# Patient Record
Sex: FEMALE | Race: WHITE | NOT HISPANIC OR LATINO | Employment: FULL TIME | URBAN - METROPOLITAN AREA
[De-identification: names, ages, dates, MRNs, and addresses within clinical notes are randomized per-mention and may not be internally consistent; named-entity substitution may affect disease eponyms.]

---

## 2018-04-25 ENCOUNTER — HOSPITAL ENCOUNTER (OUTPATIENT)
Dept: GENERAL RADIOLOGY | Facility: HOSPITAL | Age: 63
Discharge: HOME | End: 2018-04-25
Attending: FAMILY MEDICINE | Admitting: FAMILY MEDICINE

## 2018-12-28 ENCOUNTER — CONVERSION ENCOUNTER (OUTPATIENT)
Dept: FAMILY MEDICINE CLINIC | Facility: CLINIC | Age: 63
End: 2018-12-28

## 2018-12-28 LAB
ALBUMIN SERPL-MCNC: 4 G/DL (ref 3.6–5.1)
ALP SERPL-CCNC: 112 U/L (ref 33–130)
AST SERPL-CCNC: 25 U/L (ref 10–35)
BILIRUB SERPL-MCNC: 0.4 MG/DL (ref 0.2–1.2)
BUN SERPL-MCNC: 11 MG/DL (ref 7–25)
BUN/CREAT SERPL: 12.2 (CALC) (ref 6–22)
CALCIUM SERPL-MCNC: 9.4 MG/DL (ref 8.6–10.2)
CHLORIDE SERPL-SCNC: 106 MMOL/L (ref 98–110)
CHOLEST SERPL-MCNC: 202 MG/DL (ref 125–200)
CONV CO2: 23 MMOL/L (ref 21–33)
CONV TOTAL PROTEIN: 6.4 G/DL (ref 6.2–8.3)
CREAT UR-MCNC: 0.9 MG/DL (ref 0.6–1.18)
GLOBULIN UR ELPH-MCNC: 2.4 MG/DL (ref 2.2–3.9)
GLUCOSE UR QL: 105 MG/DL (ref 65–99)
HDLC SERPL-MCNC: 70 MG/DL
INSULIN SERPL-ACNC: 1.7 (CALC) (ref 1–2.1)
LDLC SERPL CALC-MCNC: 93 MG/DL
POTASSIUM SERPL-SCNC: 4.2 MMOL/L (ref 3.5–5.3)
SODIUM SERPL-SCNC: 140 MMOL/L (ref 135–146)
TRIGL SERPL-MCNC: 196 MG/DL

## 2019-08-12 ENCOUNTER — OFFICE VISIT (OUTPATIENT)
Dept: FAMILY MEDICINE CLINIC | Facility: CLINIC | Age: 64
End: 2019-08-12

## 2019-08-12 VITALS
HEIGHT: 62 IN | RESPIRATION RATE: 18 BRPM | DIASTOLIC BLOOD PRESSURE: 70 MMHG | SYSTOLIC BLOOD PRESSURE: 130 MMHG | HEART RATE: 80 BPM | WEIGHT: 203 LBS | OXYGEN SATURATION: 97 % | BODY MASS INDEX: 37.36 KG/M2 | TEMPERATURE: 97.9 F
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
## 2019-08-12 DIAGNOSIS — L03.114 CELLULITIS OF LEFT UPPER EXTREMITY: Primary | ICD-10-CM

## 2019-08-12 DIAGNOSIS — E78.2 MIXED HYPERLIPIDEMIA: ICD-10-CM

## 2019-08-12 PROBLEM — S83.242A TEAR OF MEDIAL MENISCUS OF LEFT KNEE: Status: ACTIVE | Noted: 2019-05-01

## 2019-08-12 PROCEDURE — 99213 OFFICE O/P EST LOW 20 MIN: CPT | Performed by: FAMILY MEDICINE

## 2019-08-12 RX ORDER — DOXYCYCLINE 100 MG/1
100 CAPSULE ORAL 2 TIMES DAILY
Qty: 20 CAPSULE | Refills: 0 | Status: SHIPPED | OUTPATIENT
Start: 2019-08-12 | End: 2019-08-22

## 2019-08-12 RX ORDER — MELATONIN 10 MG
1 CAPSULE ORAL NIGHTLY
COMMUNITY

## 2019-08-12 RX ORDER — ATORVASTATIN CALCIUM 20 MG/1
20 TABLET, FILM COATED ORAL DAILY
COMMUNITY
End: 2020-02-03 | Stop reason: SDUPTHER

## 2019-08-12 RX ORDER — NAPROXEN SODIUM 220 MG
440 TABLET ORAL 2 TIMES DAILY PRN
COMMUNITY
End: 2020-07-31

## 2019-08-12 RX ORDER — OMEPRAZOLE 20 MG/1
20 CAPSULE, DELAYED RELEASE ORAL DAILY
COMMUNITY

## 2019-08-12 RX ORDER — DIPHENHYDRAMINE HCL 25 MG
25 TABLET ORAL EVERY 6 HOURS PRN
COMMUNITY

## 2019-08-12 RX ORDER — ASPIRIN 81 MG/1
81 TABLET ORAL DAILY
COMMUNITY

## 2019-08-12 NOTE — PROGRESS NOTES
Subjective   Camille Mendoza is a 64 y.o. female.     Insect Bite   This is a new (Camille was bitten by a tick on Wednesday 08/07/2019) problem. The current episode started in the past 7 days. The problem occurs constantly. Pertinent negatives include no abdominal pain, arthralgias, chest pain, chills, coughing, fatigue, fever, joint swelling, nausea, rash or vomiting. Nothing aggravates the symptoms. The treatment provided no relief.   Hyperlipidemia   This is a chronic problem. The current episode started more than 1 year ago. The problem is controlled. Exacerbating diseases include obesity. She has no history of diabetes. Pertinent negatives include no chest pain. Current antihyperlipidemic treatment includes statins. The current treatment provides significant improvement of lipids. Risk factors for coronary artery disease include obesity and dyslipidemia.        The following portions of the patient's history were reviewed and updated as appropriate: allergies, current medications, past family history, past medical history, past social history, past surgical history and problem list.    Family History   Problem Relation Age of Onset   • Arthritis Mother    • Skin cancer Mother    • Diabetes Father    • Heart disease Father    • Hypertension Father        Social History     Tobacco Use   • Smoking status: Never Smoker   • Smokeless tobacco: Never Used   Substance Use Topics   • Alcohol use: Yes     Frequency: 2-3 times a week     Drinks per session: 1 or 2     Binge frequency: Never   • Drug use: No       Past Surgical History:   Procedure Laterality Date   • CARDIAC CATHETERIZATION  02/2007    Dr. Paris   • DILATION AND CURETTAGE, DIAGNOSTIC / THERAPEUTIC  1974   • KNEE ARTHROSCOPY Left 05/22/2019    [facility] with partial medial and lateral meniscectomy   • TONSILLECTOMY AND ADENOIDECTOMY  1959       Patient Active Problem List   Diagnosis   • Gastroesophageal reflux disease without esophagitis   •  "Menopausal syndrome   • Mixed hyperlipidemia   • Osteopenia of multiple sites   • Paroxysmal supraventricular tachycardia (CMS/HCC)   • Primary osteoarthritis of both knees   • Seasonal allergic rhinitis due to pollen   • Anxiety   • Tear of medial meniscus of left knee       No current outpatient medications on file prior to visit.     No current facility-administered medications on file prior to visit.        Allergies   Allergen Reactions   • Meat Extract Hives     TICK Borne Allergy, hives   • Coffee Nausea Only and Palpitations       Review of Systems   Constitutional: Negative for chills, fatigue and fever.   Respiratory: Negative for cough and wheezing.    Cardiovascular: Negative for chest pain, palpitations and leg swelling.   Gastrointestinal: Negative for abdominal pain, nausea and vomiting.   Endocrine: Negative for cold intolerance, heat intolerance, polyphagia and polyuria.   Genitourinary: Negative for dysuria.   Musculoskeletal: Negative for arthralgias and joint swelling.   Skin: Negative for rash.        Tic bite extensor surface of left wrist.    Allergic/Immunologic: Positive for environmental allergies.   Hematological: Negative for adenopathy.       Objective   Visit Vitals  /70 (BP Location: Right arm, Patient Position: Sitting, Cuff Size: Adult)   Pulse 80   Temp 97.9 °F (36.6 °C) (Oral)   Resp 18   Ht 157.5 cm (62\")   Wt 92.1 kg (203 lb)   LMP 01/01/2003 (Approximate)   SpO2 97% Comment: Room air   Breastfeeding? No   BMI 37.13 kg/m²     Physical Exam   Constitutional: She is oriented to person, place, and time. She appears well-developed and well-nourished. No distress.   HENT:   Mouth/Throat: No oropharyngeal exudate.   Eyes: Conjunctivae are normal.   Neck: Neck supple. No thyromegaly present.   Cardiovascular: Normal rate, regular rhythm, normal heart sounds and intact distal pulses. Exam reveals no gallop and no friction rub.   No murmur heard.  Pulmonary/Chest: Effort normal and " breath sounds normal. No respiratory distress. She has no wheezes. She has no rales.   Musculoskeletal: She exhibits no edema.   Lymphadenopathy:        Head (right side): No submandibular adenopathy present.        Head (left side): No submandibular adenopathy present.     She has no cervical adenopathy.     She has no axillary adenopathy.   Neurological: She is alert and oriented to person, place, and time. She displays normal reflexes. Coordination normal.   Skin: Skin is warm. No rash noted. There is erythema (there is a 1 mm puncture wound over the distal externsor surface of the left wrist in it'e center, There is a 1 cm area of surrounding erythema consisitent with cellulitis. ).   Psychiatric: She has a normal mood and affect.   Vitals reviewed.        Assessment/Plan .  Problem List Items Addressed This Visit        Cardiovascular and Mediastinum    Mixed hyperlipidemia    Overview     Compliant with meds.          Current Assessment & Plan     Lipid abnormalities are improving with treatment.  Pharmacotherapy as ordered.  Lipids will be reassessed in 6 months.           Other Visit Diagnoses     Cellulitis of left upper extremity    -  Primary    Continue local wound care and neosporin   she will follow up should sxs not resolve over the next 1 week.     Relevant Medications    doxycycline (MONODOX) 100 MG capsule    Tick bite, initial encounter

## 2019-08-30 ENCOUNTER — OFFICE VISIT (OUTPATIENT)
Dept: FAMILY MEDICINE CLINIC | Facility: CLINIC | Age: 64
End: 2019-08-30

## 2019-08-30 VITALS
DIASTOLIC BLOOD PRESSURE: 66 MMHG | RESPIRATION RATE: 18 BRPM | WEIGHT: 202 LBS | SYSTOLIC BLOOD PRESSURE: 112 MMHG | OXYGEN SATURATION: 95 % | BODY MASS INDEX: 35.79 KG/M2 | HEIGHT: 63 IN | HEART RATE: 66 BPM | TEMPERATURE: 98.5 F

## 2019-08-30 DIAGNOSIS — L43.9 LICHEN PLANUS: Primary | ICD-10-CM

## 2019-08-30 PROCEDURE — 99213 OFFICE O/P EST LOW 20 MIN: CPT | Performed by: FAMILY MEDICINE

## 2019-08-30 RX ORDER — TRIAMCINOLONE ACETONIDE 1 MG/G
CREAM TOPICAL 2 TIMES DAILY
Qty: 30 G | Refills: 1 | Status: SHIPPED | OUTPATIENT
Start: 2019-08-30 | End: 2019-09-13

## 2020-02-03 DIAGNOSIS — E78.2 MIXED HYPERLIPIDEMIA: Primary | ICD-10-CM

## 2020-02-03 RX ORDER — ATORVASTATIN CALCIUM 20 MG/1
20 TABLET, FILM COATED ORAL DAILY
Qty: 90 TABLET | Refills: 0 | Status: SHIPPED | OUTPATIENT
Start: 2020-02-03 | End: 2020-02-04

## 2020-02-04 DIAGNOSIS — E78.2 MIXED HYPERLIPIDEMIA: ICD-10-CM

## 2020-02-04 RX ORDER — ATORVASTATIN CALCIUM 20 MG/1
TABLET, FILM COATED ORAL
Qty: 90 TABLET | Refills: 0 | Status: SHIPPED | OUTPATIENT
Start: 2020-02-04 | End: 2020-05-22 | Stop reason: SDUPTHER

## 2020-05-22 ENCOUNTER — TELEPHONE (OUTPATIENT)
Dept: FAMILY MEDICINE CLINIC | Facility: CLINIC | Age: 65
End: 2020-05-22

## 2020-05-22 DIAGNOSIS — E78.2 MIXED HYPERLIPIDEMIA: ICD-10-CM

## 2020-05-22 RX ORDER — ATORVASTATIN CALCIUM 20 MG/1
20 TABLET, FILM COATED ORAL DAILY
Qty: 90 TABLET | Refills: 0 | Status: SHIPPED | OUTPATIENT
Start: 2020-05-22 | End: 2020-07-31 | Stop reason: SDUPTHER

## 2020-06-26 ENCOUNTER — OFFICE VISIT (OUTPATIENT)
Dept: FAMILY MEDICINE CLINIC | Facility: CLINIC | Age: 65
End: 2020-06-26

## 2020-06-26 VITALS
HEART RATE: 82 BPM | OXYGEN SATURATION: 98 % | SYSTOLIC BLOOD PRESSURE: 114 MMHG | HEIGHT: 62 IN | BODY MASS INDEX: 37.47 KG/M2 | RESPIRATION RATE: 16 BRPM | WEIGHT: 203.6 LBS | TEMPERATURE: 99.1 F | DIASTOLIC BLOOD PRESSURE: 70 MMHG

## 2020-06-26 DIAGNOSIS — L60.9 NAIL ABNORMALITY: ICD-10-CM

## 2020-06-26 DIAGNOSIS — S81.802A WOUND OF LEFT LOWER EXTREMITY, INITIAL ENCOUNTER: Primary | ICD-10-CM

## 2020-06-26 DIAGNOSIS — E66.09 CLASS 2 OBESITY DUE TO EXCESS CALORIES WITHOUT SERIOUS COMORBIDITY WITH BODY MASS INDEX (BMI) OF 38.0 TO 38.9 IN ADULT: ICD-10-CM

## 2020-06-26 PROCEDURE — 99213 OFFICE O/P EST LOW 20 MIN: CPT | Performed by: FAMILY MEDICINE

## 2020-06-26 NOTE — PROGRESS NOTES
Subjective   Camille Mendoza is a 65 y.o. female.     Chief Complaint   Patient presents with   • Puncture Wound   • Nail Problem       Nail Problem:   Symptoms include discoloration. Symptoms are located on left foot first toe. Onset was years. The nail(s) have been stable. Symptoms are described as Mild. Exacerbating factors include none. Current treatment includes no medications.      Puncture Wound   This is a new problem. The current episode started 1 to 4 weeks ago (06/16/2020). The problem occurs constantly. The problem has been gradually improving. Pertinent negatives include no abdominal pain, arthralgias, chest pain, chills, congestion, coughing, fatigue, fever, headaches, joint swelling, myalgias, nausea, numbness, rash, sore throat, vertigo, vomiting or weakness. Nothing aggravates the symptoms. Treatments tried: neosporin. The treatment provided no relief.        The following portions of the patient's history were reviewed and updated as appropriate: allergies, current medications, past family history, past medical history, past social history, past surgical history and problem list.    Allergies:  Allergies   Allergen Reactions   • Meat Extract Hives     TICK Borne Allergy, hives   • Coffee Nausea Only and Palpitations       Social History:  Social History     Socioeconomic History   • Marital status:      Spouse name: Not on file   • Number of children: Not on file   • Years of education: Not on file   • Highest education level: Not on file   Tobacco Use   • Smoking status: Never Smoker   • Smokeless tobacco: Never Used   Substance and Sexual Activity   • Alcohol use: Yes     Frequency: 2-3 times a week     Drinks per session: 1 or 2     Binge frequency: Never   • Drug use: No   • Sexual activity: Defer       Family History:  Family History   Problem Relation Age of Onset   • Arthritis Mother    • Skin cancer Mother    • Diabetes Father    • Heart disease Father    • Hypertension Father         Past Medical History :  Patient Active Problem List   Diagnosis   • Gastroesophageal reflux disease without esophagitis   • Menopausal syndrome   • Mixed hyperlipidemia   • Osteopenia of multiple sites   • Paroxysmal supraventricular tachycardia (CMS/HCC)   • Primary osteoarthritis of both knees   • Seasonal allergic rhinitis due to pollen   • Anxiety   • Tear of medial meniscus of left knee   • Class 2 obesity due to excess calories without serious comorbidity with body mass index (BMI) of 38.0 to 38.9 in adult       Medication List:  Outpatient Encounter Medications as of 6/26/2020   Medication Sig Dispense Refill   • aspirin 81 MG EC tablet Take 81 mg by mouth Daily.     • atorvastatin (LIPITOR) 20 MG tablet Take 1 tablet by mouth Daily. 90 tablet 0   • diphenhydrAMINE (BENADRYL) 25 MG tablet Take 25 mg by mouth Every 6 (Six) Hours As Needed for Itching.     • Melatonin 10 MG capsule Take 1 capsule by mouth Every Night.     • naproxen sodium (ALEVE) 220 MG tablet Take 440 mg by mouth 2 (Two) Times a Day As Needed.     • omeprazole (priLOSEC) 20 MG capsule Take 20 mg by mouth Daily.       No facility-administered encounter medications on file as of 6/26/2020.        Past Surgical History:  Past Surgical History:   Procedure Laterality Date   • CARDIAC CATHETERIZATION  02/2007    Dr. Paris   • DILATION AND CURETTAGE, DIAGNOSTIC / THERAPEUTIC  1974   • KNEE ARTHROSCOPY Left 05/22/2019    [facility] with partial medial and lateral meniscectomy   • TONSILLECTOMY AND ADENOIDECTOMY  1959       Review of Systems:  Review of Systems   Constitutional: Negative for chills, fatigue and fever.   HENT: Negative for congestion and sore throat.    Respiratory: Negative for cough and shortness of breath.    Cardiovascular: Negative for chest pain and leg swelling.   Gastrointestinal: Negative for abdominal pain, nausea and vomiting.   Endocrine: Negative for cold intolerance, heat intolerance and polydipsia.  "  Genitourinary: Negative for dysuria.   Musculoskeletal: Negative for arthralgias, joint swelling and myalgias.   Skin: Positive for skin lesions (wound Left mid shin as above. Mild erythema over the area ). Negative for rash.   Neurological: Negative for vertigo, weakness and numbness.   Hematological: Negative for adenopathy. Does not bruise/bleed easily.       I have reviewed and confirmed the accuracy of the ROS as documented by the MA/LPN/RN Dalila Garcia MD    Vital Signs:  Visit Vitals  /70 (BP Location: Right arm, Patient Position: Sitting, Cuff Size: Large Adult)   Pulse 82   Temp 99.1 °F (37.3 °C) (Oral)   Resp 16   Ht 157.5 cm (62\")   Wt 92.4 kg (203 lb 9.6 oz)   LMP 01/01/2003 (Approximate)   SpO2 98% Comment: Room air   BMI 37.24 kg/m²       Physical Exam   Constitutional: She is oriented to person, place, and time. She appears well-developed. No distress.   Over wt   Eyes: Pupils are equal, round, and reactive to light. EOM are normal.   Cardiovascular: Normal rate, regular rhythm and normal heart sounds. Exam reveals no gallop and no friction rub.   No murmur heard.  Pulses:       Dorsalis pedis pulses are 2+ on the right side, and 2+ on the left side.   Neative Clarisse's no edema   Pulmonary/Chest: Effort normal and breath sounds normal. No stridor. She has no wheezes. She has no rales.   Musculoskeletal: She exhibits deformity ( knees).   Neurological: She is alert and oriented to person, place, and time. Coordination normal.   Skin: Skin is warm. There is erythema (There is a circular area of mild reddness over the distal to mid shin no warmth no streaking and consisitent with a resolving hematoma. ).   The left great nail shows an 8 mm irregular black blue discoloration over the distal nail consistent with a resolving hematoma.        Assessment and Plan:  Problem List Items Addressed This Visit        Unprioritized    Class 2 obesity due to excess calories without serious comorbidity " with body mass index (BMI) of 38.0 to 38.9 in adult    Overview     Diet exercise and wt loss discussed. Techniques discussed.            Other Visit Diagnoses     Wound of left lower extremity, initial encounter    -  Primary    reassurance and follow. Sxs of infection discussed. Continue local wouond care.     Nail abnormality        Left great nail, echymossis,from tauma in her shoe,  doubt other pathology. She is encouraged to follow closely. F/U in 1 mo.           An After Visit Summary and PPPS were given to the patient.

## 2020-06-28 PROBLEM — E66.09 CLASS 2 OBESITY DUE TO EXCESS CALORIES WITHOUT SERIOUS COMORBIDITY WITH BODY MASS INDEX (BMI) OF 38.0 TO 38.9 IN ADULT: Status: ACTIVE | Noted: 2020-06-28

## 2020-06-28 PROBLEM — E66.812 CLASS 2 OBESITY DUE TO EXCESS CALORIES WITHOUT SERIOUS COMORBIDITY WITH BODY MASS INDEX (BMI) OF 38.0 TO 38.9 IN ADULT: Status: ACTIVE | Noted: 2020-06-28

## 2020-07-31 ENCOUNTER — OFFICE VISIT (OUTPATIENT)
Dept: FAMILY MEDICINE CLINIC | Facility: CLINIC | Age: 65
End: 2020-07-31

## 2020-07-31 VITALS
TEMPERATURE: 98.1 F | SYSTOLIC BLOOD PRESSURE: 124 MMHG | BODY MASS INDEX: 36.73 KG/M2 | DIASTOLIC BLOOD PRESSURE: 68 MMHG | OXYGEN SATURATION: 95 % | HEIGHT: 62 IN | HEART RATE: 94 BPM | WEIGHT: 199.6 LBS | RESPIRATION RATE: 16 BRPM

## 2020-07-31 DIAGNOSIS — K21.9 GASTROESOPHAGEAL REFLUX DISEASE WITHOUT ESOPHAGITIS: ICD-10-CM

## 2020-07-31 DIAGNOSIS — Z12.4 CERVICAL CANCER SCREENING: ICD-10-CM

## 2020-07-31 DIAGNOSIS — M17.0 PRIMARY OSTEOARTHRITIS OF BOTH KNEES: ICD-10-CM

## 2020-07-31 DIAGNOSIS — J30.1 SEASONAL ALLERGIC RHINITIS DUE TO POLLEN: ICD-10-CM

## 2020-07-31 DIAGNOSIS — Z23 NEED FOR PNEUMOCOCCAL VACCINATION: ICD-10-CM

## 2020-07-31 DIAGNOSIS — E66.09 CLASS 2 OBESITY DUE TO EXCESS CALORIES WITHOUT SERIOUS COMORBIDITY WITH BODY MASS INDEX (BMI) OF 38.0 TO 38.9 IN ADULT: ICD-10-CM

## 2020-07-31 DIAGNOSIS — Z11.59 NEED FOR HEPATITIS C SCREENING TEST: ICD-10-CM

## 2020-07-31 DIAGNOSIS — E78.2 MIXED HYPERLIPIDEMIA: ICD-10-CM

## 2020-07-31 DIAGNOSIS — Z12.31 ENCOUNTER FOR SCREENING MAMMOGRAM FOR MALIGNANT NEOPLASM OF BREAST: ICD-10-CM

## 2020-07-31 DIAGNOSIS — M85.89 OSTEOPENIA OF MULTIPLE SITES: ICD-10-CM

## 2020-07-31 DIAGNOSIS — I47.1 PAROXYSMAL SUPRAVENTRICULAR TACHYCARDIA (HCC): ICD-10-CM

## 2020-07-31 DIAGNOSIS — Z00.00 MEDICARE ANNUAL WELLNESS VISIT, INITIAL: Primary | ICD-10-CM

## 2020-07-31 DIAGNOSIS — Z12.11 COLON CANCER SCREENING: ICD-10-CM

## 2020-07-31 LAB
BILIRUB BLD-MCNC: NEGATIVE MG/DL
CLARITY, POC: CLEAR
COLOR UR: YELLOW
GLUCOSE UR STRIP-MCNC: NEGATIVE MG/DL
KETONES UR QL: NEGATIVE
LEUKOCYTE EST, POC: NEGATIVE
NITRITE UR-MCNC: NEGATIVE MG/ML
PH UR: 5 [PH] (ref 5–8)
PROT UR STRIP-MCNC: NEGATIVE MG/DL
RBC # UR STRIP: ABNORMAL /UL
SP GR UR: 1.02 (ref 1–1.03)
UROBILINOGEN UR QL: NORMAL

## 2020-07-31 PROCEDURE — G0444 DEPRESSION SCREEN ANNUAL: HCPCS | Performed by: FAMILY MEDICINE

## 2020-07-31 PROCEDURE — 99213 OFFICE O/P EST LOW 20 MIN: CPT | Performed by: FAMILY MEDICINE

## 2020-07-31 PROCEDURE — 90732 PPSV23 VACC 2 YRS+ SUBQ/IM: CPT | Performed by: FAMILY MEDICINE

## 2020-07-31 PROCEDURE — 1158F ADVNC CARE PLAN TLK DOCD: CPT | Performed by: FAMILY MEDICINE

## 2020-07-31 PROCEDURE — G0438 PPPS, INITIAL VISIT: HCPCS | Performed by: FAMILY MEDICINE

## 2020-07-31 PROCEDURE — G0009 ADMIN PNEUMOCOCCAL VACCINE: HCPCS | Performed by: FAMILY MEDICINE

## 2020-07-31 PROCEDURE — 36415 COLL VENOUS BLD VENIPUNCTURE: CPT | Performed by: FAMILY MEDICINE

## 2020-07-31 PROCEDURE — 81002 URINALYSIS NONAUTO W/O SCOPE: CPT | Performed by: FAMILY MEDICINE

## 2020-07-31 RX ORDER — ATORVASTATIN CALCIUM 20 MG/1
20 TABLET, FILM COATED ORAL DAILY
Qty: 90 TABLET | Refills: 3 | Status: SHIPPED | OUTPATIENT
Start: 2020-07-31 | End: 2021-08-13

## 2020-08-01 LAB
ALBUMIN SERPL-MCNC: 4.5 G/DL (ref 3.8–4.8)
ALBUMIN/GLOB SERPL: 1.6 {RATIO} (ref 1.2–2.2)
ALP SERPL-CCNC: 127 IU/L (ref 39–117)
ALT SERPL-CCNC: 28 IU/L (ref 0–32)
AST SERPL-CCNC: 31 IU/L (ref 0–40)
BASOPHILS # BLD AUTO: 0 X10E3/UL (ref 0–0.2)
BASOPHILS NFR BLD AUTO: 1 %
BILIRUB SERPL-MCNC: 0.6 MG/DL (ref 0–1.2)
BUN SERPL-MCNC: 14 MG/DL (ref 8–27)
BUN/CREAT SERPL: 16 (ref 12–28)
CALCIUM SERPL-MCNC: 9.5 MG/DL (ref 8.7–10.3)
CHLORIDE SERPL-SCNC: 104 MMOL/L (ref 96–106)
CHOLEST SERPL-MCNC: 193 MG/DL (ref 100–199)
CHOLEST/HDLC SERPL: 3.2 RATIO (ref 0–4.4)
CO2 SERPL-SCNC: 22 MMOL/L (ref 20–29)
CREAT SERPL-MCNC: 0.88 MG/DL (ref 0.57–1)
EOSINOPHIL # BLD AUTO: 0.2 X10E3/UL (ref 0–0.4)
EOSINOPHIL NFR BLD AUTO: 4 %
ERYTHROCYTE [DISTWIDTH] IN BLOOD BY AUTOMATED COUNT: 12.7 % (ref 11.7–15.4)
GLOBULIN SER CALC-MCNC: 2.8 G/DL (ref 1.5–4.5)
GLUCOSE SERPL-MCNC: 115 MG/DL (ref 65–99)
HCT VFR BLD AUTO: 41.6 % (ref 34–46.6)
HCV AB S/CO SERPL IA: <0.1 S/CO RATIO (ref 0–0.9)
HDLC SERPL-MCNC: 60 MG/DL
HGB BLD-MCNC: 13.9 G/DL (ref 11.1–15.9)
IMM GRANULOCYTES # BLD AUTO: 0 X10E3/UL (ref 0–0.1)
IMM GRANULOCYTES NFR BLD AUTO: 0 %
LDLC SERPL CALC-MCNC: 101 MG/DL (ref 0–99)
LYMPHOCYTES # BLD AUTO: 1.8 X10E3/UL (ref 0.7–3.1)
LYMPHOCYTES NFR BLD AUTO: 31 %
MCH RBC QN AUTO: 30.6 PG (ref 26.6–33)
MCHC RBC AUTO-ENTMCNC: 33.4 G/DL (ref 31.5–35.7)
MCV RBC AUTO: 92 FL (ref 79–97)
MONOCYTES # BLD AUTO: 0.4 X10E3/UL (ref 0.1–0.9)
MONOCYTES NFR BLD AUTO: 7 %
NEUTROPHILS # BLD AUTO: 3.3 X10E3/UL (ref 1.4–7)
NEUTROPHILS NFR BLD AUTO: 57 %
PLATELET # BLD AUTO: 248 X10E3/UL (ref 150–450)
POTASSIUM SERPL-SCNC: 4.6 MMOL/L (ref 3.5–5.2)
PROT SERPL-MCNC: 7.3 G/DL (ref 6–8.5)
RBC # BLD AUTO: 4.54 X10E6/UL (ref 3.77–5.28)
SODIUM SERPL-SCNC: 139 MMOL/L (ref 134–144)
TRIGL SERPL-MCNC: 158 MG/DL (ref 0–149)
TSH SERPL DL<=0.005 MIU/L-ACNC: 2.18 UIU/ML (ref 0.45–4.5)
VLDLC SERPL CALC-MCNC: 32 MG/DL (ref 5–40)
WBC # BLD AUTO: 5.8 X10E3/UL (ref 3.4–10.8)

## 2020-08-04 ENCOUNTER — HOSPITAL ENCOUNTER (OUTPATIENT)
Dept: ULTRASOUND IMAGING | Facility: HOSPITAL | Age: 65
Discharge: HOME | End: 2020-08-04
Admitting: FAMILY MEDICINE

## 2020-08-04 ENCOUNTER — HOSPITAL ENCOUNTER (OUTPATIENT)
Dept: MAMMOGRAPHY | Facility: HOSPITAL | Age: 65
Discharge: HOME | End: 2020-08-04
Admitting: FAMILY MEDICINE

## 2020-08-04 ENCOUNTER — HOSPITAL ENCOUNTER (OUTPATIENT)
Dept: BONE DENSITY | Facility: HOSPITAL | Age: 65
Discharge: HOME | End: 2020-08-04

## 2020-08-04 DIAGNOSIS — Z12.31 SCREENING MAMMOGRAM, ENCOUNTER FOR: ICD-10-CM

## 2020-08-04 DIAGNOSIS — Z13.6 SCREENING FOR CARDIOVASCULAR CONDITION: ICD-10-CM

## 2020-08-04 PROCEDURE — 77067 SCR MAMMO BI INCL CAD: CPT

## 2020-08-04 PROCEDURE — 77080 DXA BONE DENSITY AXIAL: CPT

## 2020-08-04 PROCEDURE — 77063 BREAST TOMOSYNTHESIS BI: CPT

## 2020-08-04 PROCEDURE — 93799 UNLISTED CV SVC/PROCEDURE: CPT

## 2020-08-06 LAB
BH CV ECHO MEAS - DIST AO DIAM: 1.3 CM
BH CV XLRA MEAS - MID AO DIAM: 1.9 CM
BH CV XLRA MEAS - PAD LEFT ABI DP: 1
BH CV XLRA MEAS - PAD LEFT ABI PT: 1.3
BH CV XLRA MEAS - PAD LEFT ARM: 130 MMHG
BH CV XLRA MEAS - PAD LEFT LEG DP: 135 MMHG
BH CV XLRA MEAS - PAD LEFT LEG PT: 170 MMHG
BH CV XLRA MEAS - PAD RIGHT ABI DP: 1.2
BH CV XLRA MEAS - PAD RIGHT ABI PT: 1.4
BH CV XLRA MEAS - PAD RIGHT ARM: 120 MMHG
BH CV XLRA MEAS - PAD RIGHT LEG DP: 160 MMHG
BH CV XLRA MEAS - PAD RIGHT LEG PT: 180 MMHG
BH CV XLRA MEAS - PROX AO DIAM: 2.2 CM
BH CV XLRA MEAS LEFT ICA/CCA RATIO: 1.2
BH CV XLRA MEAS LEFT MID CCA PSV: 87.8 CM/SEC
BH CV XLRA MEAS LEFT MID ICA PSV: 102.4 CM/SEC
BH CV XLRA MEAS LEFT PROX ECA PSV: 89 CM/SEC
BH CV XLRA MEAS RIGHT ICA/CCA RATIO: 1
BH CV XLRA MEAS RIGHT MID CCA PSV: 94.4 CM/SEC
BH CV XLRA MEAS RIGHT MID ICA PSV: 91 CM/SEC
BH CV XLRA MEAS RIGHT PROX ECA PSV: 63.6 CM/SEC

## 2020-08-08 PROBLEM — S83.242A TEAR OF MEDIAL MENISCUS OF LEFT KNEE: Status: RESOLVED | Noted: 2019-05-01 | Resolved: 2020-08-08

## 2020-08-09 NOTE — ASSESSMENT & PLAN NOTE
Obesity is improving with treatment.  Discussed the patient's BMI.  The BMI is above average; BMI management plan is completed.  General weight loss/lifestyle modification strategies discussed (elicit support from others; identify saboteurs; non-food rewards, etc).

## 2020-08-09 NOTE — ASSESSMENT & PLAN NOTE
Lipid abnormalities are improving with treatment.  Nutritional counseling was provided., The patient was referred to the dietician. and Pharmacotherapy as ordered.  Lipids will be reassessed in 1 year.

## 2020-09-24 ENCOUNTER — FLU SHOT (OUTPATIENT)
Dept: FAMILY MEDICINE CLINIC | Facility: CLINIC | Age: 65
End: 2020-09-24

## 2020-09-24 VITALS — TEMPERATURE: 97.7 F

## 2020-09-24 DIAGNOSIS — Z23 NEED FOR INFLUENZA VACCINATION: Primary | ICD-10-CM

## 2020-09-24 PROCEDURE — G0008 ADMIN INFLUENZA VIRUS VAC: HCPCS | Performed by: FAMILY MEDICINE

## 2020-09-24 PROCEDURE — 90694 VACC AIIV4 NO PRSRV 0.5ML IM: CPT | Performed by: FAMILY MEDICINE

## 2020-10-20 DIAGNOSIS — F43.21 GRIEF REACTION: Primary | ICD-10-CM

## 2020-10-20 RX ORDER — ALPRAZOLAM 0.5 MG/1
0.5 TABLET ORAL 3 TIMES DAILY PRN
Qty: 30 TABLET | Refills: 0 | Status: SHIPPED | OUTPATIENT
Start: 2020-10-20 | End: 2020-11-20 | Stop reason: SDUPTHER

## 2020-11-13 ENCOUNTER — OFFICE VISIT (OUTPATIENT)
Dept: FAMILY MEDICINE CLINIC | Facility: CLINIC | Age: 65
End: 2020-11-13

## 2020-11-13 VITALS
DIASTOLIC BLOOD PRESSURE: 70 MMHG | HEART RATE: 84 BPM | RESPIRATION RATE: 16 BRPM | OXYGEN SATURATION: 97 % | HEIGHT: 62 IN | TEMPERATURE: 96.2 F | SYSTOLIC BLOOD PRESSURE: 124 MMHG | BODY MASS INDEX: 36.58 KG/M2 | WEIGHT: 198.8 LBS

## 2020-11-13 DIAGNOSIS — U07.1 ACUTE BRONCHITIS DUE TO COVID-19 VIRUS: ICD-10-CM

## 2020-11-13 DIAGNOSIS — E66.09 CLASS 2 OBESITY DUE TO EXCESS CALORIES WITHOUT SERIOUS COMORBIDITY WITH BODY MASS INDEX (BMI) OF 36.0 TO 36.9 IN ADULT: ICD-10-CM

## 2020-11-13 DIAGNOSIS — J20.8 ACUTE BRONCHITIS DUE TO COVID-19 VIRUS: ICD-10-CM

## 2020-11-13 DIAGNOSIS — R05.9 COUGH: Primary | ICD-10-CM

## 2020-11-13 LAB
EXPIRATION DATE: NORMAL
FLUAV AG NPH QL: NEGATIVE
FLUBV AG NPH QL: NEGATIVE
INTERNAL CONTROL: NORMAL
Lab: NORMAL

## 2020-11-13 PROCEDURE — 87804 INFLUENZA ASSAY W/OPTIC: CPT | Performed by: FAMILY MEDICINE

## 2020-11-13 PROCEDURE — 99213 OFFICE O/P EST LOW 20 MIN: CPT | Performed by: FAMILY MEDICINE

## 2020-11-13 RX ORDER — PREDNISONE 10 MG/1
10 TABLET ORAL TAKE AS DIRECTED
Qty: 35 TABLET | Refills: 0 | Status: SHIPPED | OUTPATIENT
Start: 2020-11-13 | End: 2020-11-28

## 2020-11-13 NOTE — PROGRESS NOTES
Subjective   Camille Mendoza is a 65 y.o. female.     Chief Complaint   Patient presents with   • URI       URI   This is a new problem. The current episode started in the past 7 days (11/11/2020). The problem has been gradually worsening. There has been no fever. Associated symptoms include congestion, coughing (scant, clear in color), headaches, rhinorrhea, sinus pain, sneezing and wheezing. Pertinent negatives include no abdominal pain, chest pain, diarrhea, ear pain, nausea, sore throat or vomiting. She has tried nothing for the symptoms. The treatment provided no relief.        The following portions of the patient's history were reviewed and updated as appropriate: allergies, current medications, past family history, past medical history, past social history, past surgical history and problem list.    Allergies:  Allergies   Allergen Reactions   • Meat Extract Hives     TICK Borne Allergy, hives   • Coffee Nausea Only and Palpitations       Social History:  Social History     Socioeconomic History   • Marital status:      Spouse name: Not on file   • Number of children: Not on file   • Years of education: Not on file   • Highest education level: Not on file   Tobacco Use   • Smoking status: Never Smoker   • Smokeless tobacco: Never Used   Substance and Sexual Activity   • Alcohol use: Yes     Frequency: 2-3 times a week     Drinks per session: 1 or 2     Binge frequency: Never   • Drug use: No   • Sexual activity: Defer       Family History:  Family History   Problem Relation Age of Onset   • Arthritis Mother    • Skin cancer Mother    • Diabetes Father    • Heart disease Father    • Hypertension Father        Past Medical History :  Patient Active Problem List   Diagnosis   • Gastroesophageal reflux disease without esophagitis   • Menopausal syndrome   • Mixed hyperlipidemia   • Osteopenia of multiple sites   • Paroxysmal supraventricular tachycardia (CMS/HCC)   • Primary osteoarthritis of both knees    • Seasonal allergic rhinitis due to pollen   • Anxiety   • Class 2 obesity due to excess calories without serious comorbidity with body mass index (BMI) of 38.0 to 38.9 in adult   • Medicare annual wellness visit, initial   • Cervical cancer screening   • Encounter for screening mammogram for malignant neoplasm of breast   • Colon cancer screening       Medication List:  Outpatient Encounter Medications as of 11/13/2020   Medication Sig Dispense Refill   • ALPRAZolam (XANAX) 0.5 MG tablet Take 1 tablet by mouth 3 (Three) Times a Day As Needed for Anxiety. Use sparingly. 30 tablet 0   • aspirin 81 MG EC tablet Take 81 mg by mouth Daily.     • atorvastatin (LIPITOR) 20 MG tablet Take 1 tablet by mouth Daily. 90 tablet 3   • diphenhydrAMINE (BENADRYL) 25 MG tablet Take 25 mg by mouth Every 6 (Six) Hours As Needed for Itching.     • Melatonin 10 MG capsule Take 1 capsule by mouth Every Night.     • omeprazole (priLOSEC) 20 MG capsule Take 20 mg by mouth Daily.       No facility-administered encounter medications on file as of 11/13/2020.        Past Surgical History:  Past Surgical History:   Procedure Laterality Date   • CARDIAC CATHETERIZATION  02/2007    Dr. Paris   • DILATION AND CURETTAGE, DIAGNOSTIC / THERAPEUTIC  1974   • KNEE ARTHROSCOPY Left 05/22/2019    [facility] with partial medial and lateral meniscectomy   • TONSILLECTOMY AND ADENOIDECTOMY  1959       Review of Systems:  Review of Systems   Constitutional: Positive for fatigue and fever. Negative for unexpected weight gain and unexpected weight loss.   HENT: Positive for congestion, rhinorrhea and sneezing. Negative for ear pain and sore throat.    Eyes: Negative for visual disturbance.   Respiratory: Positive for cough (scant, clear in color) and wheezing.    Cardiovascular: Negative for chest pain, palpitations and leg swelling.   Gastrointestinal: Negative for abdominal pain, diarrhea, nausea and vomiting.   Endocrine: Negative for cold  "intolerance, heat intolerance, polydipsia and polyuria.   Musculoskeletal: Positive for myalgias. Negative for arthralgias.       I have reviewed and confirmed the accuracy of the HPI and ROS as documented by the MA/LPN/RN Viktoriya Keenan MA    Vital Signs:  Visit Vitals  /70 (BP Location: Right arm, Patient Position: Sitting, Cuff Size: Adult)   Pulse 84   Temp 96.2 °F (35.7 °C) (Temporal)   Resp 16   Ht 157.5 cm (62\")   Wt 90.2 kg (198 lb 12.8 oz)   LMP 01/01/2003 (Approximate)   SpO2 97% Comment: Room air   BMI 36.36 kg/m²       Physical Exam  Vitals signs reviewed.   Constitutional:       General: She is not in acute distress.     Appearance: She is well-developed. She is ill-appearing.   HENT:      Right Ear: Tympanic membrane and external ear normal.      Left Ear: Tympanic membrane and external ear normal.   Eyes:      Conjunctiva/sclera: Conjunctivae normal.   Neck:      Musculoskeletal: Neck supple.      Thyroid: No thyromegaly.      Vascular: No JVD.   Cardiovascular:      Rate and Rhythm: Normal rate and regular rhythm.      Heart sounds: Normal heart sounds. No murmur. No friction rub. No gallop.    Pulmonary:      Effort: Pulmonary effort is normal. No respiratory distress.      Breath sounds: No wheezing or rales.      Comments: BS coarse  Musculoskeletal:      Right lower leg: No edema.      Left lower leg: No edema.   Lymphadenopathy:      Cervical: No cervical adenopathy.   Skin:     General: Skin is warm.      Findings: No erythema or rash.   Neurological:      Mental Status: She is alert and oriented to person, place, and time.      Coordination: Coordination normal.         Assessment and Plan:  Problem List Items Addressed This Visit     None          An After Visit Summary and PPPS were given to the patient.       I wore protective equipment throughout this patient encounter to include mask and eye protection. Hand hygiene was performed before donning protective equipment and after " removal when leaving the room.

## 2020-11-17 LAB — SARS-COV-2 RNA RESP QL NAA+PROBE: DETECTED

## 2020-11-18 ENCOUNTER — TELEPHONE (OUTPATIENT)
Dept: FAMILY MEDICINE CLINIC | Facility: CLINIC | Age: 65
End: 2020-11-18

## 2020-11-18 NOTE — TELEPHONE ENCOUNTER
Spoke with Camille   Temp  ,    02 sat 95-97 slight cough,  pressure around eyes and face. Camille questions if she should start the steroids that she has at home. Per Dr Garcia start steroids.

## 2020-11-20 DIAGNOSIS — F43.21 GRIEF REACTION: ICD-10-CM

## 2020-11-20 RX ORDER — ALPRAZOLAM 0.5 MG/1
0.5 TABLET ORAL 3 TIMES DAILY PRN
Qty: 30 TABLET | Refills: 0 | Status: SHIPPED | OUTPATIENT
Start: 2020-11-20 | End: 2021-02-22 | Stop reason: SDUPTHER

## 2021-02-22 DIAGNOSIS — L03.90 CELLULITIS, UNSPECIFIED CELLULITIS SITE: Primary | ICD-10-CM

## 2021-02-22 DIAGNOSIS — F43.21 GRIEF REACTION: Primary | ICD-10-CM

## 2021-02-22 RX ORDER — CLINDAMYCIN HYDROCHLORIDE 300 MG/1
300 CAPSULE ORAL 3 TIMES DAILY
Qty: 30 CAPSULE | Refills: 0 | Status: SHIPPED | OUTPATIENT
Start: 2021-02-22 | End: 2021-10-19

## 2021-02-22 RX ORDER — ALPRAZOLAM 0.5 MG/1
0.5 TABLET ORAL 3 TIMES DAILY PRN
Qty: 30 TABLET | Refills: 0 | Status: SHIPPED | OUTPATIENT
Start: 2021-02-22 | End: 2021-06-11 | Stop reason: SDUPTHER

## 2021-06-11 DIAGNOSIS — F43.21 GRIEF REACTION: ICD-10-CM

## 2021-06-11 RX ORDER — ALPRAZOLAM 0.25 MG/1
0.25 TABLET ORAL EVERY 6 HOURS PRN
Qty: 30 TABLET | Refills: 0 | Status: CANCELLED | OUTPATIENT
Start: 2021-06-11

## 2021-06-11 RX ORDER — ALPRAZOLAM 0.5 MG/1
0.5 TABLET ORAL 2 TIMES DAILY PRN
Qty: 30 TABLET | Refills: 0 | Status: SHIPPED | OUTPATIENT
Start: 2021-06-11 | End: 2021-10-19 | Stop reason: SDUPTHER

## 2021-06-24 ENCOUNTER — OFFICE VISIT (OUTPATIENT)
Dept: FAMILY MEDICINE CLINIC | Facility: CLINIC | Age: 66
End: 2021-06-24

## 2021-06-24 VITALS
HEIGHT: 63 IN | SYSTOLIC BLOOD PRESSURE: 130 MMHG | BODY MASS INDEX: 34.52 KG/M2 | WEIGHT: 194.8 LBS | HEART RATE: 91 BPM | DIASTOLIC BLOOD PRESSURE: 87 MMHG | OXYGEN SATURATION: 97 % | RESPIRATION RATE: 18 BRPM | TEMPERATURE: 97.7 F

## 2021-06-24 DIAGNOSIS — S40.862A INSECT BITE OF LEFT UPPER ARM, INITIAL ENCOUNTER: ICD-10-CM

## 2021-06-24 DIAGNOSIS — L03.112 CELLULITIS OF LEFT AXILLA: Primary | ICD-10-CM
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
## 2021-06-24 PROBLEM — L03.90 CELLULITIS: Status: ACTIVE | Noted: 2021-06-24

## 2021-06-24 PROCEDURE — 99213 OFFICE O/P EST LOW 20 MIN: CPT | Performed by: FAMILY MEDICINE

## 2021-06-24 RX ORDER — PREDNISONE 10 MG/1
10 TABLET ORAL TAKE AS DIRECTED
Qty: 35 TABLET | Refills: 0 | Status: SHIPPED | OUTPATIENT
Start: 2021-06-24 | End: 2021-07-09

## 2021-06-24 RX ORDER — TRIAMCINOLONE ACETONIDE 1 MG/G
CREAM TOPICAL 2 TIMES DAILY
Qty: 60 G | Refills: 1 | Status: SHIPPED | OUTPATIENT
Start: 2021-06-24

## 2021-06-24 RX ORDER — CEPHALEXIN 500 MG/1
500 CAPSULE ORAL 3 TIMES DAILY
Qty: 30 CAPSULE | Refills: 0 | Status: SHIPPED | OUTPATIENT
Start: 2021-06-24 | End: 2021-10-19

## 2021-06-24 NOTE — PROGRESS NOTES
Subjective   Camille Mendoza is a 66 y.o. female.   Chief Complaint   Patient presents with   • Insect Bite     Insect Bite  This is a new problem. The current episode started in the past 7 days. The problem occurs constantly. The problem has been unchanged. Pertinent negatives include no chills, diaphoresis or fever. Associated symptoms comments: itching. The treatment provided no relief.        The following portions of the patient's history were reviewed and updated as appropriate: allergies, current medications, past family history, past medical history, past social history, past surgical history and problem list.    Past Medical History:   Diagnosis Date   • Anxiety    • Gastroesophageal reflux disease without esophagitis    • Menopausal syndrome    • Mixed hyperlipidemia    • Osteopenia of multiple sites    • Paroxysmal supraventricular tachycardia (CMS/HCC)    • Primary osteoarthritis of both knees    • Seasonal allergic rhinitis due to pollen        Past Surgical History:   Procedure Laterality Date   • CARDIAC CATHETERIZATION  02/2007    Dr. Paris   • DILATION AND CURETTAGE, DIAGNOSTIC / THERAPEUTIC  1974   • KNEE ARTHROSCOPY Left 05/22/2019    [facility] with partial medial and lateral meniscectomy   • TONSILLECTOMY AND ADENOIDECTOMY  1959        Family History   Problem Relation Age of Onset   • Arthritis Mother    • Skin cancer Mother    • Diabetes Father    • Heart disease Father    • Hypertension Father         Social History     Socioeconomic History   • Marital status:      Spouse name: Not on file   • Number of children: Not on file   • Years of education: Not on file   • Highest education level: Not on file   Tobacco Use   • Smoking status: Never Smoker   • Smokeless tobacco: Never Used   Substance and Sexual Activity   • Alcohol use: Yes   • Drug use: No   • Sexual activity: Defer         Review of Systems   Constitutional: Negative for appetite change, chills, diaphoresis and  "fever.   Skin:        Itching  Insect bites       Objective   Visit Vitals  /87 (BP Location: Right arm, Patient Position: Sitting, Cuff Size: Adult)   Pulse 91   Temp 97.7 °F (36.5 °C) (Temporal)   Resp 18   Ht 158.8 cm (62.5\")   Wt 88.4 kg (194 lb 12.8 oz)   LMP 01/01/2003 (Approximate)   SpO2 97%   BMI 35.06 kg/m²     Physical Exam  Vitals and nursing note reviewed.   Constitutional:       Appearance: She is well-developed.   HENT:      Head: Normocephalic.   Neck:      Thyroid: No thyromegaly.      Vascular: No carotid bruit.      Trachea: Trachea normal.   Cardiovascular:      Rate and Rhythm: Normal rate and regular rhythm.      Heart sounds: No murmur heard.   No friction rub. No gallop.    Pulmonary:      Effort: Pulmonary effort is normal. No respiratory distress.      Breath sounds: Normal breath sounds. No wheezing.   Chest:      Chest wall: No tenderness.   Musculoskeletal:      Cervical back: Neck supple.   Skin:     General: Skin is dry.      Findings: No rash.      Nails: There is no clubbing.          Neurological:      Mental Status: She is alert and oriented to person, place, and time.   Psychiatric:         Behavior: Behavior is cooperative.         Assessment/Plan   Problem List Items Addressed This Visit        Unprioritized    Cellulitis - Primary    Current Assessment & Plan     New dx.  Left axillae. Possible allergic reaction to insect bite.   Patient given order for PRN K-flex         Insect bite of left upper arm    Current Assessment & Plan     New dx.  Possible allergic reaction to insect bite.  Advised to try Benadryl, and cold compress.  Patient given written order for Prednisone PRN.                        "

## 2021-06-24 NOTE — ASSESSMENT & PLAN NOTE
New dx.  Possible allergic reaction to insect bite.  Advised to try Benadryl, and cold compress.  Patient given written order for Prednisone PRN.

## 2021-06-24 NOTE — ASSESSMENT & PLAN NOTE
New dx.  Left axillae. Possible allergic reaction to insect bite.   Patient given order for PRN K-flex

## 2021-08-13 DIAGNOSIS — E78.2 MIXED HYPERLIPIDEMIA: ICD-10-CM

## 2021-08-13 RX ORDER — ATORVASTATIN CALCIUM 20 MG/1
20 TABLET, FILM COATED ORAL DAILY
Qty: 90 TABLET | Refills: 3 | Status: SHIPPED | OUTPATIENT
Start: 2021-08-13 | End: 2021-10-19 | Stop reason: SDUPTHER

## 2021-08-23 DIAGNOSIS — Z12.31 ENCOUNTER FOR SCREENING MAMMOGRAM FOR MALIGNANT NEOPLASM OF BREAST: Primary | ICD-10-CM

## 2021-08-30 ENCOUNTER — HOSPITAL ENCOUNTER (OUTPATIENT)
Dept: MAMMOGRAPHY | Facility: HOSPITAL | Age: 66
Discharge: HOME | End: 2021-08-30
Admitting: FAMILY MEDICINE

## 2021-08-30 DIAGNOSIS — Z12.31 ENCOUNTER FOR SCREENING MAMMOGRAM FOR MALIGNANT NEOPLASM OF BREAST: ICD-10-CM

## 2021-08-30 PROCEDURE — 77067 SCR MAMMO BI INCL CAD: CPT

## 2021-08-30 PROCEDURE — 77063 BREAST TOMOSYNTHESIS BI: CPT

## 2021-09-07 DIAGNOSIS — E78.2 MIXED HYPERLIPIDEMIA: Primary | ICD-10-CM

## 2021-09-07 DIAGNOSIS — I47.1 PAROXYSMAL SUPRAVENTRICULAR TACHYCARDIA (HCC): ICD-10-CM

## 2021-09-30 ENCOUNTER — TRANSCRIBE ORDERS (OUTPATIENT)
Dept: ADMINISTRATIVE | Facility: HOSPITAL | Age: 66
End: 2021-09-30

## 2021-09-30 DIAGNOSIS — Z13.6 ENCOUNTER FOR SCREENING FOR VASCULAR DISEASE: Primary | ICD-10-CM

## 2021-10-07 ENCOUNTER — HOSPITAL ENCOUNTER (OUTPATIENT)
Dept: CT IMAGING | Facility: HOSPITAL | Age: 66
Discharge: HOME | End: 2021-10-07

## 2021-10-07 ENCOUNTER — HOSPITAL ENCOUNTER (OUTPATIENT)
Dept: CARDIOLOGY | Facility: HOSPITAL | Age: 66
Discharge: HOME | End: 2021-10-07

## 2021-10-07 DIAGNOSIS — Z13.6 ENCOUNTER FOR SCREENING FOR VASCULAR DISEASE: ICD-10-CM

## 2021-10-07 PROCEDURE — 93799 UNLISTED CV SVC/PROCEDURE: CPT

## 2021-10-07 PROCEDURE — 75571 CT HRT W/O DYE W/CA TEST: CPT

## 2021-10-08 LAB
BH CV XLRA MEAS - MID AO DIAM: 1.9 CM
BH CV XLRA MEAS - PAD LEFT ABI PT: 1.17
BH CV XLRA MEAS - PAD LEFT ARM: 138 MMHG
BH CV XLRA MEAS - PAD LEFT LEG PT: 162 MMHG
BH CV XLRA MEAS - PAD RIGHT ABI PT: 1.18
BH CV XLRA MEAS - PAD RIGHT ARM: 128 MMHG
BH CV XLRA MEAS - PAD RIGHT LEG PT: 163 MMHG
BH CV XLRA MEAS LEFT DIST CCA PSV: -66.9 CM/SEC
BH CV XLRA MEAS LEFT ICA/CCA RATIO: 0.8
BH CV XLRA MEAS LEFT MID CCA PSV: 67 CM/SEC
BH CV XLRA MEAS LEFT MID ICA PSV: 55 CM/SEC
BH CV XLRA MEAS LEFT PROX ICA PSV: -55 CM/SEC
BH CV XLRA MEAS RIGHT DIST CCA PSV: 59.9 CM/SEC
BH CV XLRA MEAS RIGHT ICA/CCA RATIO: 0.7
BH CV XLRA MEAS RIGHT MID CCA PSV: 60 CM/SEC
BH CV XLRA MEAS RIGHT MID ICA PSV: 44 CM/SEC
BH CV XLRA MEAS RIGHT PROX ICA PSV: -43.8 CM/SEC
MAXIMAL PREDICTED HEART RATE: 152 BPM
STRESS TARGET HR: 129 BPM

## 2021-10-13 DIAGNOSIS — I25.10 PLAQUE IN HEART ARTERY: Primary | ICD-10-CM

## 2021-10-15 LAB
ALBUMIN SERPL-MCNC: 4.8 G/DL (ref 3.8–4.8)
ALBUMIN/GLOB SERPL: 2.1 {RATIO} (ref 1.2–2.2)
ALP SERPL-CCNC: 120 IU/L (ref 44–121)
ALT SERPL-CCNC: 16 IU/L (ref 0–32)
AST SERPL-CCNC: 19 IU/L (ref 0–40)
BASOPHILS # BLD AUTO: 0 X10E3/UL (ref 0–0.2)
BASOPHILS NFR BLD AUTO: 0 %
BILIRUB SERPL-MCNC: 0.7 MG/DL (ref 0–1.2)
BUN SERPL-MCNC: 14 MG/DL (ref 8–27)
BUN/CREAT SERPL: 16 (ref 12–28)
CALCIUM SERPL-MCNC: 9.7 MG/DL (ref 8.7–10.3)
CHLORIDE SERPL-SCNC: 101 MMOL/L (ref 96–106)
CHOLEST SERPL-MCNC: 180 MG/DL (ref 100–199)
CHOLEST/HDLC SERPL: 3.3 RATIO (ref 0–4.4)
CO2 SERPL-SCNC: 24 MMOL/L (ref 20–29)
CREAT SERPL-MCNC: 0.86 MG/DL (ref 0.57–1)
EOSINOPHIL # BLD AUTO: 0.2 X10E3/UL (ref 0–0.4)
EOSINOPHIL NFR BLD AUTO: 3 %
ERYTHROCYTE [DISTWIDTH] IN BLOOD BY AUTOMATED COUNT: 12.1 % (ref 11.7–15.4)
GLOBULIN SER CALC-MCNC: 2.3 G/DL (ref 1.5–4.5)
GLUCOSE SERPL-MCNC: 102 MG/DL (ref 65–99)
HCT VFR BLD AUTO: 41.9 % (ref 34–46.6)
HDLC SERPL-MCNC: 55 MG/DL
HGB BLD-MCNC: 14.2 G/DL (ref 11.1–15.9)
IMM GRANULOCYTES # BLD AUTO: 0 X10E3/UL (ref 0–0.1)
IMM GRANULOCYTES NFR BLD AUTO: 0 %
LDLC SERPL CALC-MCNC: 100 MG/DL (ref 0–99)
LYMPHOCYTES # BLD AUTO: 1.9 X10E3/UL (ref 0.7–3.1)
LYMPHOCYTES NFR BLD AUTO: 34 %
MCH RBC QN AUTO: 31.1 PG (ref 26.6–33)
MCHC RBC AUTO-ENTMCNC: 33.9 G/DL (ref 31.5–35.7)
MCV RBC AUTO: 92 FL (ref 79–97)
MONOCYTES # BLD AUTO: 0.5 X10E3/UL (ref 0.1–0.9)
MONOCYTES NFR BLD AUTO: 8 %
NEUTROPHILS # BLD AUTO: 3.1 X10E3/UL (ref 1.4–7)
NEUTROPHILS NFR BLD AUTO: 55 %
PLATELET # BLD AUTO: 246 X10E3/UL (ref 150–450)
POTASSIUM SERPL-SCNC: 4.8 MMOL/L (ref 3.5–5.2)
PROT SERPL-MCNC: 7.1 G/DL (ref 6–8.5)
RBC # BLD AUTO: 4.56 X10E6/UL (ref 3.77–5.28)
SODIUM SERPL-SCNC: 141 MMOL/L (ref 134–144)
TRIGL SERPL-MCNC: 140 MG/DL (ref 0–149)
TSH SERPL DL<=0.005 MIU/L-ACNC: 2.22 UIU/ML (ref 0.45–4.5)
VLDLC SERPL CALC-MCNC: 25 MG/DL (ref 5–40)
WBC # BLD AUTO: 5.7 X10E3/UL (ref 3.4–10.8)

## 2021-10-19 ENCOUNTER — OFFICE VISIT (OUTPATIENT)
Dept: FAMILY MEDICINE CLINIC | Facility: CLINIC | Age: 66
End: 2021-10-19

## 2021-10-19 VITALS
HEART RATE: 71 BPM | SYSTOLIC BLOOD PRESSURE: 118 MMHG | TEMPERATURE: 97.3 F | DIASTOLIC BLOOD PRESSURE: 70 MMHG | RESPIRATION RATE: 18 BRPM | HEIGHT: 61 IN | WEIGHT: 193.4 LBS | OXYGEN SATURATION: 96 % | BODY MASS INDEX: 36.51 KG/M2

## 2021-10-19 DIAGNOSIS — E78.2 MIXED HYPERLIPIDEMIA: ICD-10-CM

## 2021-10-19 DIAGNOSIS — R35.0 URINARY FREQUENCY: ICD-10-CM

## 2021-10-19 DIAGNOSIS — E66.09 CLASS 2 OBESITY DUE TO EXCESS CALORIES WITHOUT SERIOUS COMORBIDITY WITH BODY MASS INDEX (BMI) OF 36.0 TO 36.9 IN ADULT: ICD-10-CM

## 2021-10-19 DIAGNOSIS — Z00.00 MEDICARE ANNUAL WELLNESS VISIT, SUBSEQUENT: Primary | ICD-10-CM

## 2021-10-19 DIAGNOSIS — Z23 NEED FOR INFLUENZA VACCINATION: ICD-10-CM

## 2021-10-19 DIAGNOSIS — J30.1 SEASONAL ALLERGIC RHINITIS DUE TO POLLEN: ICD-10-CM

## 2021-10-19 DIAGNOSIS — Z12.31 ENCOUNTER FOR SCREENING MAMMOGRAM FOR MALIGNANT NEOPLASM OF BREAST: ICD-10-CM

## 2021-10-19 DIAGNOSIS — F43.21 GRIEF REACTION: ICD-10-CM

## 2021-10-19 DIAGNOSIS — I47.1 PAROXYSMAL SUPRAVENTRICULAR TACHYCARDIA (HCC): ICD-10-CM

## 2021-10-19 DIAGNOSIS — F41.9 ANXIETY: ICD-10-CM

## 2021-10-19 DIAGNOSIS — M85.89 OSTEOPENIA OF MULTIPLE SITES: ICD-10-CM

## 2021-10-19 DIAGNOSIS — Z12.4 CERVICAL CANCER SCREENING: ICD-10-CM

## 2021-10-19 DIAGNOSIS — M17.0 PRIMARY OSTEOARTHRITIS OF BOTH KNEES: ICD-10-CM

## 2021-10-19 DIAGNOSIS — Z12.11 COLON CANCER SCREENING: ICD-10-CM

## 2021-10-19 DIAGNOSIS — K21.9 GASTROESOPHAGEAL REFLUX DISEASE WITHOUT ESOPHAGITIS: ICD-10-CM

## 2021-10-19 PROBLEM — S40.862A INSECT BITE OF LEFT UPPER ARM: Status: RESOLVED | Noted: 2021-06-24 | Resolved: 2021-10-19

## 2021-10-19 PROBLEM — S83.242A TEAR OF MEDIAL MENISCUS OF LEFT KNEE: Status: RESOLVED | Noted: 2019-05-01 | Resolved: 2021-10-19

## 2021-10-19 PROBLEM — L03.90 CELLULITIS: Status: RESOLVED | Noted: 2021-06-24 | Resolved: 2021-10-19
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
## 2021-10-19 LAB
BILIRUB BLD-MCNC: NEGATIVE MG/DL
CLARITY, POC: CLEAR
COLOR UR: YELLOW
GLUCOSE UR STRIP-MCNC: NEGATIVE MG/DL
KETONES UR QL: NEGATIVE
LEUKOCYTE EST, POC: NEGATIVE
NITRITE UR-MCNC: NEGATIVE MG/ML
PH UR: 5 [PH] (ref 5–8)
PROT UR STRIP-MCNC: NEGATIVE MG/DL
RBC # UR STRIP: ABNORMAL /UL
SP GR UR: 1.01 (ref 1–1.03)
UROBILINOGEN UR QL: NORMAL

## 2021-10-19 PROCEDURE — 1126F AMNT PAIN NOTED NONE PRSNT: CPT | Performed by: FAMILY MEDICINE

## 2021-10-19 PROCEDURE — 99214 OFFICE O/P EST MOD 30 MIN: CPT | Performed by: FAMILY MEDICINE

## 2021-10-19 PROCEDURE — 81002 URINALYSIS NONAUTO W/O SCOPE: CPT | Performed by: FAMILY MEDICINE

## 2021-10-19 PROCEDURE — 1170F FXNL STATUS ASSESSED: CPT | Performed by: FAMILY MEDICINE

## 2021-10-19 PROCEDURE — G0008 ADMIN INFLUENZA VIRUS VAC: HCPCS | Performed by: FAMILY MEDICINE

## 2021-10-19 PROCEDURE — 90662 IIV NO PRSV INCREASED AG IM: CPT | Performed by: FAMILY MEDICINE

## 2021-10-19 PROCEDURE — 1160F RVW MEDS BY RX/DR IN RCRD: CPT | Performed by: FAMILY MEDICINE

## 2021-10-19 PROCEDURE — G0439 PPPS, SUBSEQ VISIT: HCPCS | Performed by: FAMILY MEDICINE

## 2021-10-19 RX ORDER — ATORVASTATIN CALCIUM 40 MG/1
40 TABLET, FILM COATED ORAL DAILY
Qty: 90 TABLET | Refills: 3 | Status: SHIPPED | OUTPATIENT
Start: 2021-10-19 | End: 2022-10-07

## 2021-10-19 RX ORDER — ALPRAZOLAM 0.5 MG/1
0.5 TABLET ORAL 2 TIMES DAILY PRN
Qty: 30 TABLET | Refills: 0 | Status: SHIPPED | OUTPATIENT
Start: 2021-10-19 | End: 2022-04-21 | Stop reason: SDUPTHER

## 2021-10-19 NOTE — PROGRESS NOTES
The ABCs of the Annual Wellness Visit  Subsequent Medicare Wellness Visit    Chief Complaint   Patient presents with   • Medicare Wellness-subsequent   • Hyperlipidemia      Subjective    History of Present Illness:  Camille Mendoza is a 66 y.o. female who presents for a Subsequent Medicare Wellness Visit. The patient is here: for coordination of medical care to discuss health maintenance and disease prevention. Overall has: moderate activity with work/home activities, exercises 2 - 3 times per week, good appetite, feels well with no complaints, good energy level and is sleeping well. Nutrition: balanced diet. Last tetanus shot was 2012.    Hyperlipidemia  This is a chronic problem. The current episode started more than 1 year ago. The problem is uncontrolled. Recent lipid tests were reviewed and are high. Exacerbating diseases include obesity. She has no history of diabetes. Pertinent negatives include no chest pain, myalgias or shortness of breath. Current antihyperlipidemic treatment includes statins. The current treatment provides moderate improvement of lipids. Risk factors for coronary artery disease include dyslipidemia, post-menopausal, obesity and family history.       The following portions of the patient's history were reviewed and   updated as appropriate: allergies, current medications, past family history, past medical history, past social history, past surgical history and problem list.     Compared to one year ago, the patient feels her physical   health is the same.    Compared to one year ago, the patient feels her mental   health is the same.    Recent Hospitalizations:  She was not admitted to the hospital during the last year.       Current Medical Providers:  Patient Care Team:  Dalila Garcia MD as PCP - General    Outpatient Medications Prior to Visit   Medication Sig Dispense Refill   • aspirin 81 MG EC tablet Take 81 mg by mouth Daily.     • diphenhydrAMINE (BENADRYL) 25 MG tablet Take  25 mg by mouth Every 6 (Six) Hours As Needed for Itching.     • Melatonin 10 MG capsule Take 1 capsule by mouth Every Night.     • omeprazole (priLOSEC) 20 MG capsule Take 20 mg by mouth Daily.     • triamcinolone (KENALOG) 0.1 % cream Apply  topically to the appropriate area as directed 2 (Two) Times a Day. 60 g 1   • ALPRAZolam (XANAX) 0.5 MG tablet Take 1 tablet by mouth 2 (Two) Times a Day As Needed for Anxiety. Use sparingly. 30 tablet 0   • atorvastatin (LIPITOR) 20 MG tablet TAKE 1 TABLET BY MOUTH DAILY 90 tablet 3   • cephalexin (KEFLEX) 500 MG capsule Take 1 capsule by mouth 3 (Three) Times a Day. 30 capsule 0   • clindamycin (CLEOCIN) 300 MG capsule Take 1 capsule by mouth 3 (Three) Times a Day. 30 capsule 0     No facility-administered medications prior to visit.       No opioid medication identified on active medication list. I have reviewed chart for other potential  high risk medication/s and harmful drug interactions in the elderly.          Aspirin is on active medication list. Aspirin use is indicated based on review of current medical condition/s. Pros and cons of this therapy have been discussed today. Benefits of this medication outweigh potential harm.  Patient has been encouraged to continue taking this medication.  .      Patient Active Problem List   Diagnosis   • Gastroesophageal reflux disease without esophagitis   • Menopausal syndrome   • Mixed hyperlipidemia   • Osteopenia of multiple sites   • Paroxysmal supraventricular tachycardia (HCC)   • Primary osteoarthritis of both knees   • Seasonal allergic rhinitis due to pollen   • Anxiety   • Class 2 obesity due to excess calories without serious comorbidity with body mass index (BMI) of 36.0 to 36.9 in adult   • Medicare annual wellness visit, subsequent   • Cervical cancer screening   • Encounter for screening mammogram for malignant neoplasm of breast   • Colon cancer screening     Advance Care Planning   Advance Directive is not on  file.  ACP discussion was held with the patient during this visit. Patient does not have an advance directive, information provided.    A face-to-face visit was completed today with patient.  Counseling explanation, and discussion of advanced directives was performed.   The last advanced care visit was performed in 2020.  In a near life ending situation, from which she is not expected to recover functionally, and she is not able to speak for herself, she does not want life sustaining measures. We discussed feeding tubes, ventilators and cardiac support as well as dialysis.    I spent less than 16 minutes discussing Advanced Care Planning information and documenting in the chart.    Review of Systems   Constitutional: Negative for activity change, appetite change, fatigue, fever and unexpected weight change.   HENT: Negative for congestion, hearing loss, rhinorrhea, sinus pain, sore throat, tinnitus, trouble swallowing and voice change.    Eyes: Negative for visual disturbance.   Respiratory: Negative for apnea, cough, shortness of breath and wheezing.    Cardiovascular: Negative for chest pain and palpitations.   Gastrointestinal: Negative for abdominal pain, blood in stool, constipation, diarrhea, nausea and vomiting.   Endocrine: Negative for cold intolerance, heat intolerance, polydipsia and polyuria.   Genitourinary: Positive for frequency. Negative for difficulty urinating, dysuria, flank pain and hematuria.   Musculoskeletal: Positive for arthralgias (multiple worse in knees. ). Negative for joint swelling and myalgias.   Skin: Negative for rash.   Allergic/Immunologic: Negative for environmental allergies and immunocompromised state.   Neurological: Negative for dizziness, syncope, weakness, numbness and headaches.   Hematological: Negative for adenopathy. Does not bruise/bleed easily.   Psychiatric/Behavioral: Negative for dysphoric mood and suicidal ideas. The patient is not nervous/anxious.        I have  "reviewed and confirmed the accuracy of the HPI and ROS as documented by the MA/LPN/RN Dalila Garcia MD    Reviewed chart for potential of high risk medication in the elderly: yes  Reviewed chart for potential of harmful drug interactions in the elderly:yes       Objective       Vitals:    10/19/21 1452   BP: 118/70   BP Location: Right arm   Patient Position: Sitting   Cuff Size: Adult   Pulse: 71   Resp: 18   Temp: 97.3 °F (36.3 °C)   TempSrc: Temporal   SpO2: 96%  Comment: Room air   Weight: 87.7 kg (193 lb 6.4 oz)   Height: 154.9 cm (61\")   PainSc: 0-No pain     BMI Readings from Last 1 Encounters:   10/19/21 36.54 kg/m²   BMI is above normal parameters. Recommendations include: nutrition counseling    Does the patient have evidence of cognitive impairment? No    Physical Exam  Constitutional:       General: She is not in acute distress.     Appearance: She is well-developed. She is obese.   HENT:      Head: Normocephalic. Hair is normal.      Right Ear: Tympanic membrane and external ear normal.      Left Ear: Tympanic membrane and external ear normal.      Nose: Nose normal. No mucosal edema.      Mouth/Throat:      Pharynx: Uvula midline.   Eyes:      General:         Right eye: No discharge.         Left eye: No discharge.      Conjunctiva/sclera: Conjunctivae normal.      Pupils: Pupils are equal, round, and reactive to light.   Neck:      Thyroid: No thyromegaly.      Vascular: No JVD.   Cardiovascular:      Rate and Rhythm: Normal rate and regular rhythm.      Chest Wall: PMI is not displaced.      Pulses:           Carotid pulses are 2+ on the right side and 2+ on the left side.       Femoral pulses are 2+ on the right side and 2+ on the left side.       Dorsalis pedis pulses are 2+ on the right side and 2+ on the left side.      Heart sounds: Normal heart sounds. No murmur heard.  No friction rub. No gallop.       Comments: Negative Homans' no edema  Pulmonary:      Effort: Pulmonary effort is " normal. No respiratory distress.      Breath sounds: Normal breath sounds. No decreased breath sounds, wheezing, rhonchi or rales.   Chest:   Breasts: Breasts are symmetrical.      Right: No inverted nipple, mass, nipple discharge, skin change, tenderness or supraclavicular adenopathy.      Left: No inverted nipple, mass, nipple discharge, skin change, tenderness or supraclavicular adenopathy.       Abdominal:      General: Bowel sounds are normal. There is no distension or abdominal bruit.      Palpations: Abdomen is soft. There is no mass.      Tenderness: There is no abdominal tenderness.   Musculoskeletal:         General: Deformity (knees and distal fingers, decrased ROM) present. No tenderness.      Cervical back: Normal range of motion and neck supple. No muscular tenderness.   Lymphadenopathy:      Cervical: No cervical adenopathy.      Upper Body:      Right upper body: No supraclavicular adenopathy.      Left upper body: No supraclavicular adenopathy.   Skin:     General: Skin is warm and dry.      Findings: No ecchymosis, erythema, lesion or rash.   Neurological:      Mental Status: She is alert and oriented to person, place, and time.      Cranial Nerves: No cranial nerve deficit.      Sensory: No sensory deficit.      Motor: No abnormal muscle tone.      Coordination: Coordination normal.      Deep Tendon Reflexes: Reflexes are normal and symmetric. Reflexes normal.   Psychiatric:         Speech: Speech normal.         Behavior: Behavior normal.         Thought Content: Thought content normal. Thought content does not include suicidal ideation.         Cognition and Memory: She does not exhibit impaired recent memory or impaired remote memory.         Judgment: Judgment normal. Judgment is not impulsive.       Lab Results   Component Value Date     (H) 10/14/2021    CHLPL 180 10/14/2021    TRIG 140 10/14/2021    HDL 55 10/14/2021     (H) 10/14/2021    VLDL 25 10/14/2021            HEALTH  RISK ASSESSMENT    Smoking Status:  Social History     Tobacco Use   Smoking Status Never Smoker   Smokeless Tobacco Never Used     Alcohol Consumption:  Social History     Substance and Sexual Activity   Alcohol Use Yes     Fall Risk Screen:    STEADI Fall Risk Assessment was completed, and patient is at LOW risk for falls.Assessment completed on:10/19/2021    Depression Screening:  PHQ-2/PHQ-9 Depression Screening 10/19/2021   Little interest or pleasure in doing things 0   Feeling down, depressed, or hopeless 1   Trouble falling or staying asleep, or sleeping too much 0   Feeling tired or having little energy 0   Poor appetite or overeating 0   Feeling bad about yourself - or that you are a failure or have let yourself or your family down 0   Trouble concentrating on things, such as reading the newspaper or watching television 0   Moving or speaking so slowly that other people could have noticed. Or the opposite - being so fidgety or restless that you have been moving around a lot more than usual 0   Thoughts that you would be better off dead, or of hurting yourself in some way 0   Total Score 1   If you checked off any problems, how difficult have these problems made it for you to do your work, take care of things at home, or get along with other people? Not difficult at all       Health Habits and Functional and Cognitive Screening:  Functional & Cognitive Status 10/19/2021   Do you have difficulty preparing food and eating? No   Do you have difficulty bathing yourself, getting dressed or grooming yourself? No   Do you have difficulty using the toilet? No   Do you have difficulty moving around from place to place? No   Do you have trouble with steps or getting out of a bed or a chair? No   Current Diet Well Balanced Diet   Dental Exam Not up to date        Dental Exam Comment [facility] [place]/2021   Eye Exam Not up to date   Exercise (times per week) 7 times per week   Current Exercises  Include Gardening   Current Exercise Activities Include -   Do you need help using the phone?  No   Are you deaf or do you have serious difficulty hearing?  No   Do you need help with transportation? No   Do you need help shopping? No   Do you need help preparing meals?  No   Do you need help with housework?  No   Do you need help with laundry? No   Do you need help taking your medications? No   Do you need help managing money? No   Do you ever drive or ride in a car without wearing a seat belt? No   Have you felt unusual stress, anger or loneliness in the last month? No   Who do you live with? Alone   If you need help, do you have trouble finding someone available to you? No   Have you been bothered in the last four weeks by sexual problems? No   Do you have difficulty concentrating, remembering or making decisions? No       Age-appropriate Screening Schedule:  Refer to the list below for future screening recommendations based on patient's age, sex and/or medical conditions. Orders for these recommended tests are listed in the plan section. The patient has been provided with a written plan.    Health Maintenance   Topic Date Due   • ZOSTER VACCINE (2 of 3) 02/25/2013   • DXA SCAN  08/04/2022   • LIPID PANEL  10/14/2022   • TDAP/TD VACCINES (2 - Td or Tdap) 12/24/2022   • MAMMOGRAM  08/30/2023   • INFLUENZA VACCINE  Completed              Assessment/Plan     CMS Preventative Services Quick Reference  Risk Factors Identified During Encounter  Cardiovascular Disease  The above risks/problems have been discussed with the patient.  Follow up actions/plans if indicated are seen below in the Assessment/Plan Section.  Pertinent information has been shared with the patient in the After Visit Summary.    Problem List Items Addressed This Visit        High    Paroxysmal supraventricular tachycardia (HCC)    Overview     Stable off meds            Medium    Mixed hyperlipidemia    Overview     Compliant with meds.   Stable          Current Assessment & Plan     Lipid abnormalities are improving with treatment.  Nutritional counseling was provided. and Pharmacotherapy as ordered.  Lipids will be reassessed in 1 year.         Relevant Medications    atorvastatin (LIPITOR) 40 MG tablet       Low    Gastroesophageal reflux disease without esophagitis    Overview     stable on meds         Primary osteoarthritis of both knees    Overview     Present stable with minimal sxs         Seasonal allergic rhinitis due to pollen    Overview     Prescription(s) as below.  Medication(s) usage and side effects discussed.   epi-pen. For severe rxn.  No recent usage            Unprioritized    Osteopenia of multiple sites    Overview     08/04/2020 -2.0 spine ,-0.5 FN   -2.3 spine, -1.5 FN, -1.3 hip, 2018 bone density repeated, notify her of results   -2.4 spine, -1.6 FN, -1.4 hip, 2013  -1.3 spine, -1.3 FN, -0.1 hip, 2011.  -0.8 spine, -0.7 FN, -0.1 hip, 2007  Calcium rich diet and exercise encouraged.  Calcium 1200 mg with vit d daily         Anxiety    Overview     stable with rare prn meds.   She is recently . She has a good support network. She is doing well and compliant with meds.          Class 2 obesity due to excess calories without serious comorbidity with body mass index (BMI) of 36.0 to 36.9 in adult    Overview     Diet exercise and wt loss discussed. Techniques discussed         Current Assessment & Plan     Patient's (Body mass index is 36.54 kg/m².) indicates that they are obese (BMI >30) with health conditions that include dyslipidemias . Weight is improving with treatment. BMI is is above average; BMI management plan is completed. We discussed portion control and increasing exercise.          Medicare annual wellness visit, subsequent - Primary    Overview     Healthy diet and regular exercise. Breast self exams seat belts sunscreens, general safety discussed and encoruaged. Lab discussed in detail.          Relevant Orders     POCT urinalysis dipstick, manual (Completed)    Cervical cancer screening    Overview     Last pap smear 11/2/2015 negative. HPV negative         Encounter for screening mammogram for malignant neoplasm of breast    Overview     Last mammogram 08/30/2021         Colon cancer screening    Overview     Last colonoscopy 01/14/2013 Repeat 2023           Other Visit Diagnoses     Grief reaction        Doing well Prn spraing xanax    Relevant Medications    ALPRAZolam (XANAX) 0.5 MG tablet    Urinary frequency        Relevant Orders    Urinalysis With Microscopic - Urine, Clean Catch (Completed)    Need for influenza vaccination        Relevant Orders    Fluzone High-Dose 65+yrs (0383-3602) (Completed)           Follow Up:   Return in about 1 year (around 10/19/2022).     An After Visit Summary and PPPS were given to the patient.

## 2021-10-20 LAB
APPEARANCE UR: CLEAR
BACTERIA #/AREA URNS HPF: ABNORMAL /[HPF]
BILIRUB UR QL STRIP: NEGATIVE
CASTS URNS QL MICRO: ABNORMAL /LPF
COLOR UR: YELLOW
EPI CELLS #/AREA URNS HPF: >10 /HPF (ref 0–10)
GLUCOSE UR QL: NEGATIVE
HGB UR QL STRIP: NEGATIVE
KETONES UR QL STRIP: NEGATIVE
LEUKOCYTE ESTERASE UR QL STRIP: ABNORMAL
MICRO URNS: ABNORMAL
NITRITE UR QL STRIP: NEGATIVE
PH UR STRIP: 5.5 [PH] (ref 5–7.5)
PROT UR QL STRIP: NEGATIVE
RBC #/AREA URNS HPF: ABNORMAL /HPF (ref 0–2)
SP GR UR: 1.03 (ref 1–1.03)
UROBILINOGEN UR STRIP-MCNC: 0.2 MG/DL (ref 0.2–1)
WBC #/AREA URNS HPF: ABNORMAL /HPF (ref 0–5)

## 2021-10-21 ENCOUNTER — TELEPHONE (OUTPATIENT)
Dept: CARDIOLOGY | Facility: CLINIC | Age: 66
End: 2021-10-21

## 2021-10-21 NOTE — TELEPHONE ENCOUNTER
Spoke with patient today and she has a Stress test scheduled and said she would call us if she felt she needed a cardiologist.         Unable to reach pt, have left v/m for her to return my call so we could discuss this.     ----- Message from Randall Villavicencio MD sent at 10/7/2021  9:27 AM EDT -----  Abnormal calcium score  Advised patient to follow-up in office in the next few months

## 2021-10-31 NOTE — ASSESSMENT & PLAN NOTE
Patient's (Body mass index is 36.54 kg/m².) indicates that they are obese (BMI >30) with health conditions that include dyslipidemias . Weight is improving with treatment. BMI is is above average; BMI management plan is completed. We discussed portion control and increasing exercise.

## 2021-11-08 ENCOUNTER — HOSPITAL ENCOUNTER (OUTPATIENT)
Dept: NUCLEAR MEDICINE | Facility: HOSPITAL | Age: 66
Discharge: HOME | End: 2021-11-08

## 2021-11-08 DIAGNOSIS — I25.10 PLAQUE IN HEART ARTERY: ICD-10-CM

## 2021-11-08 PROCEDURE — 78452 HT MUSCLE IMAGE SPECT MULT: CPT | Performed by: INTERNAL MEDICINE

## 2021-11-08 PROCEDURE — A9500 TC99M SESTAMIBI: HCPCS | Performed by: FAMILY MEDICINE

## 2021-11-08 PROCEDURE — 78452 HT MUSCLE IMAGE SPECT MULT: CPT

## 2021-11-08 PROCEDURE — 0 TECHNETIUM SESTAMIBI: Performed by: FAMILY MEDICINE

## 2021-11-08 PROCEDURE — 93018 CV STRESS TEST I&R ONLY: CPT | Performed by: INTERNAL MEDICINE

## 2021-11-08 PROCEDURE — 93016 CV STRESS TEST SUPVJ ONLY: CPT | Performed by: INTERNAL MEDICINE

## 2021-11-08 PROCEDURE — 93017 CV STRESS TEST TRACING ONLY: CPT

## 2021-11-08 RX ADMIN — TECHNETIUM TC 99M SESTAMIBI 1 DOSE: 1 INJECTION INTRAVENOUS at 09:20

## 2021-11-08 RX ADMIN — TECHNETIUM TC 99M SESTAMIBI 1 DOSE: 1 INJECTION INTRAVENOUS at 10:50

## 2021-11-09 LAB
BH CV REST NUCLEAR ISOTOPE DOSE: 7.2 MCI
BH CV STRESS BP STAGE 1: NORMAL
BH CV STRESS BP STAGE 2: NORMAL
BH CV STRESS DURATION MIN STAGE 1: 3
BH CV STRESS DURATION MIN STAGE 2: 2
BH CV STRESS DURATION SEC STAGE 1: 0
BH CV STRESS DURATION SEC STAGE 2: 1
BH CV STRESS GRADE STAGE 1: 10
BH CV STRESS GRADE STAGE 2: 12
BH CV STRESS HR STAGE 1: 110
BH CV STRESS HR STAGE 2: 144
BH CV STRESS METS STAGE 1: 5
BH CV STRESS METS STAGE 2: 7.5
BH CV STRESS NUCLEAR ISOTOPE DOSE: 21.7 MCI
BH CV STRESS PROTOCOL 1: NORMAL
BH CV STRESS RECOVERY BP: NORMAL MMHG
BH CV STRESS RECOVERY HR: 77 BPM
BH CV STRESS SPEED STAGE 1: 1.7
BH CV STRESS SPEED STAGE 2: 2.5
BH CV STRESS STAGE 1: 1
BH CV STRESS STAGE 2: 2
LV EF NUC BP: 72 %
MAXIMAL PREDICTED HEART RATE: 152 BPM
PERCENT MAX PREDICTED HR: 99.34 %
STRESS BASELINE BP: NORMAL MMHG
STRESS BASELINE HR: 70 BPM
STRESS PERCENT HR: 117 %
STRESS POST PEAK BP: NORMAL MMHG
STRESS POST PEAK HR: 151 BPM
STRESS TARGET HR: 129 BPM

## 2022-01-05 DIAGNOSIS — J06.9 UPPER RESPIRATORY TRACT INFECTION, UNSPECIFIED TYPE: Primary | ICD-10-CM

## 2022-01-05 RX ORDER — AZITHROMYCIN 250 MG/1
TABLET, FILM COATED ORAL
Qty: 6 TABLET | Refills: 0 | Status: SHIPPED | OUTPATIENT
Start: 2022-01-05 | End: 2022-11-18

## 2022-01-05 RX ORDER — PREDNISONE 10 MG/1
10 TABLET ORAL TAKE AS DIRECTED
Qty: 35 TABLET | Refills: 0 | Status: SHIPPED | OUTPATIENT
Start: 2022-01-05 | End: 2022-01-20

## 2022-04-21 DIAGNOSIS — F43.21 GRIEF REACTION: ICD-10-CM

## 2022-04-21 RX ORDER — ALPRAZOLAM 0.5 MG/1
0.5 TABLET ORAL 2 TIMES DAILY PRN
Qty: 30 TABLET | Refills: 0 | Status: SHIPPED | OUTPATIENT
Start: 2022-04-21 | End: 2022-10-04 | Stop reason: SDUPTHER

## 2022-09-06 DIAGNOSIS — M85.89 OSTEOPENIA OF MULTIPLE SITES: ICD-10-CM

## 2022-09-06 DIAGNOSIS — Z12.31 ENCOUNTER FOR SCREENING MAMMOGRAM FOR MALIGNANT NEOPLASM OF BREAST: Primary | ICD-10-CM

## 2022-09-16 ENCOUNTER — HOSPITAL ENCOUNTER (OUTPATIENT)
Dept: MAMMOGRAPHY | Facility: HOSPITAL | Age: 67
Discharge: HOME | End: 2022-09-16

## 2022-09-16 ENCOUNTER — HOSPITAL ENCOUNTER (OUTPATIENT)
Dept: BONE DENSITY | Facility: HOSPITAL | Age: 67
Discharge: HOME | End: 2022-09-16

## 2022-09-16 DIAGNOSIS — Z12.31 ENCOUNTER FOR SCREENING MAMMOGRAM FOR MALIGNANT NEOPLASM OF BREAST: ICD-10-CM

## 2022-09-16 DIAGNOSIS — M85.89 OSTEOPENIA OF MULTIPLE SITES: ICD-10-CM

## 2022-09-16 PROCEDURE — 77067 SCR MAMMO BI INCL CAD: CPT

## 2022-09-16 PROCEDURE — 77080 DXA BONE DENSITY AXIAL: CPT

## 2022-09-16 PROCEDURE — 77063 BREAST TOMOSYNTHESIS BI: CPT

## 2022-10-04 DIAGNOSIS — F41.9 ANXIETY: Primary | ICD-10-CM

## 2022-10-04 DIAGNOSIS — F43.21 GRIEF REACTION: ICD-10-CM

## 2022-10-04 RX ORDER — ALPRAZOLAM 0.5 MG/1
0.5 TABLET ORAL 2 TIMES DAILY PRN
Qty: 30 TABLET | Refills: 0 | Status: SHIPPED | OUTPATIENT
Start: 2022-10-04 | End: 2023-01-27 | Stop reason: SDUPTHER

## 2022-10-07 DIAGNOSIS — E78.2 MIXED HYPERLIPIDEMIA: ICD-10-CM

## 2022-10-07 RX ORDER — ATORVASTATIN CALCIUM 40 MG/1
40 TABLET, FILM COATED ORAL DAILY
Qty: 90 TABLET | Refills: 0 | Status: SHIPPED | OUTPATIENT
Start: 2022-10-07 | End: 2023-01-03

## 2022-11-14 DIAGNOSIS — E78.2 MIXED HYPERLIPIDEMIA: Primary | ICD-10-CM

## 2022-11-16 LAB
ALBUMIN SERPL-MCNC: 4.8 G/DL (ref 3.8–4.8)
ALBUMIN/GLOB SERPL: 1.8 {RATIO} (ref 1.2–2.2)
ALP SERPL-CCNC: 143 IU/L (ref 44–121)
ALT SERPL-CCNC: 18 IU/L (ref 0–32)
AST SERPL-CCNC: 27 IU/L (ref 0–40)
BASOPHILS # BLD AUTO: 0 X10E3/UL (ref 0–0.2)
BASOPHILS NFR BLD AUTO: 1 %
BILIRUB SERPL-MCNC: 0.6 MG/DL (ref 0–1.2)
BUN SERPL-MCNC: 10 MG/DL (ref 8–27)
BUN/CREAT SERPL: 12 (ref 12–28)
CALCIUM SERPL-MCNC: 10.1 MG/DL (ref 8.7–10.3)
CHLORIDE SERPL-SCNC: 100 MMOL/L (ref 96–106)
CHOLEST SERPL-MCNC: 199 MG/DL (ref 100–199)
CHOLEST/HDLC SERPL: 3.2 RATIO (ref 0–4.4)
CO2 SERPL-SCNC: 24 MMOL/L (ref 20–29)
CREAT SERPL-MCNC: 0.81 MG/DL (ref 0.57–1)
EGFRCR SERPLBLD CKD-EPI 2021: 79 ML/MIN/1.73
EOSINOPHIL # BLD AUTO: 0.2 X10E3/UL (ref 0–0.4)
EOSINOPHIL NFR BLD AUTO: 4 %
ERYTHROCYTE [DISTWIDTH] IN BLOOD BY AUTOMATED COUNT: 12.5 % (ref 11.7–15.4)
GLOBULIN SER CALC-MCNC: 2.6 G/DL (ref 1.5–4.5)
GLUCOSE SERPL-MCNC: 109 MG/DL (ref 70–99)
HCT VFR BLD AUTO: 41.2 % (ref 34–46.6)
HDLC SERPL-MCNC: 62 MG/DL
HGB BLD-MCNC: 14.4 G/DL (ref 11.1–15.9)
IMM GRANULOCYTES # BLD AUTO: 0 X10E3/UL (ref 0–0.1)
IMM GRANULOCYTES NFR BLD AUTO: 0 %
LDLC SERPL CALC-MCNC: 108 MG/DL (ref 0–99)
LYMPHOCYTES # BLD AUTO: 2 X10E3/UL (ref 0.7–3.1)
LYMPHOCYTES NFR BLD AUTO: 33 %
MCH RBC QN AUTO: 31.3 PG (ref 26.6–33)
MCHC RBC AUTO-ENTMCNC: 35 G/DL (ref 31.5–35.7)
MCV RBC AUTO: 90 FL (ref 79–97)
MONOCYTES # BLD AUTO: 0.4 X10E3/UL (ref 0.1–0.9)
MONOCYTES NFR BLD AUTO: 6 %
NEUTROPHILS # BLD AUTO: 3.4 X10E3/UL (ref 1.4–7)
NEUTROPHILS NFR BLD AUTO: 56 %
PLATELET # BLD AUTO: 254 X10E3/UL (ref 150–450)
POTASSIUM SERPL-SCNC: 4.6 MMOL/L (ref 3.5–5.2)
PROT SERPL-MCNC: 7.4 G/DL (ref 6–8.5)
RBC # BLD AUTO: 4.6 X10E6/UL (ref 3.77–5.28)
SODIUM SERPL-SCNC: 139 MMOL/L (ref 134–144)
TRIGL SERPL-MCNC: 170 MG/DL (ref 0–149)
TSH SERPL DL<=0.005 MIU/L-ACNC: 3.22 UIU/ML (ref 0.45–4.5)
VLDLC SERPL CALC-MCNC: 29 MG/DL (ref 5–40)
WBC # BLD AUTO: 6 X10E3/UL (ref 3.4–10.8)

## 2022-11-16 NOTE — PROGRESS NOTES
CBC shows no anemia, and white count is normal.Glucose 109. Kidneys, electrolytes an thyroid are normal. Liver functions are normal except alkaline phosphatase is elevated due to arthritis.Cholesterol  199, triglycerides 170, HDL 62, , cardiac ratio 3.2. Continue medications as ordered with diet, exercise and weigh control.Repeat labs in one year.

## 2022-11-17 NOTE — PROGRESS NOTES
Called  left message, per Dr Garcia CBC shows no anemia,white count is normal. Glucose 109. Kidneys, electrolytes and thyroid are normal. Liver functions are normal except alkaline phosphatase is elevated due to arthritis. Cholesterol 199, triglycerides 170, HDL 62, , cardiac ratio 3.2. Continue all medications as ordered with diet, exercise and weight control. We will repeat labs in one year.

## 2022-11-18 ENCOUNTER — OFFICE VISIT (OUTPATIENT)
Dept: FAMILY MEDICINE CLINIC | Facility: CLINIC | Age: 67
End: 2022-11-18

## 2022-11-18 VITALS
RESPIRATION RATE: 18 BRPM | HEIGHT: 61 IN | BODY MASS INDEX: 38.4 KG/M2 | TEMPERATURE: 97.7 F | WEIGHT: 203.4 LBS | SYSTOLIC BLOOD PRESSURE: 126 MMHG | HEART RATE: 87 BPM | OXYGEN SATURATION: 98 % | DIASTOLIC BLOOD PRESSURE: 70 MMHG

## 2022-11-18 DIAGNOSIS — R73.9 HYPERGLYCEMIA: ICD-10-CM

## 2022-11-18 DIAGNOSIS — E78.2 MIXED HYPERLIPIDEMIA: ICD-10-CM

## 2022-11-18 DIAGNOSIS — M17.0 PRIMARY OSTEOARTHRITIS OF BOTH KNEES: ICD-10-CM

## 2022-11-18 DIAGNOSIS — I47.1 PAROXYSMAL SUPRAVENTRICULAR TACHYCARDIA: ICD-10-CM

## 2022-11-18 DIAGNOSIS — Z12.11 COLON CANCER SCREENING: ICD-10-CM

## 2022-11-18 DIAGNOSIS — Z23 NEED FOR INFLUENZA VACCINATION: ICD-10-CM

## 2022-11-18 DIAGNOSIS — F41.9 ANXIETY: ICD-10-CM

## 2022-11-18 DIAGNOSIS — Z12.31 ENCOUNTER FOR SCREENING MAMMOGRAM FOR MALIGNANT NEOPLASM OF BREAST: ICD-10-CM

## 2022-11-18 DIAGNOSIS — J30.1 SEASONAL ALLERGIC RHINITIS DUE TO POLLEN: ICD-10-CM

## 2022-11-18 DIAGNOSIS — Z12.4 CERVICAL CANCER SCREENING: ICD-10-CM

## 2022-11-18 DIAGNOSIS — Z00.00 MEDICARE ANNUAL WELLNESS VISIT, SUBSEQUENT: Primary | ICD-10-CM

## 2022-11-18 DIAGNOSIS — M85.89 OSTEOPENIA OF MULTIPLE SITES: ICD-10-CM

## 2022-11-18 DIAGNOSIS — K21.9 GASTROESOPHAGEAL REFLUX DISEASE WITHOUT ESOPHAGITIS: ICD-10-CM

## 2022-11-18 DIAGNOSIS — R31.9 HEMATURIA, UNSPECIFIED TYPE: ICD-10-CM

## 2022-11-18 DIAGNOSIS — E66.09 CLASS 2 OBESITY DUE TO EXCESS CALORIES WITHOUT SERIOUS COMORBIDITY WITH BODY MASS INDEX (BMI) OF 38.0 TO 38.9 IN ADULT: ICD-10-CM

## 2022-11-18 LAB
BILIRUB BLD-MCNC: NEGATIVE MG/DL
CLARITY, POC: ABNORMAL
COLOR UR: YELLOW
GLUCOSE UR STRIP-MCNC: NEGATIVE MG/DL
KETONES UR QL: ABNORMAL
LEUKOCYTE EST, POC: NEGATIVE
NITRITE UR-MCNC: NEGATIVE MG/ML
PH UR: 5 [PH] (ref 5–8)
PROT UR STRIP-MCNC: ABNORMAL MG/DL
RBC # UR STRIP: ABNORMAL /UL
SP GR UR: 1.02 (ref 1–1.03)
UROBILINOGEN UR QL: NORMAL

## 2022-11-18 PROCEDURE — G0444 DEPRESSION SCREEN ANNUAL: HCPCS | Performed by: FAMILY MEDICINE

## 2022-11-18 PROCEDURE — 1158F ADVNC CARE PLAN TLK DOCD: CPT | Performed by: FAMILY MEDICINE

## 2022-11-18 PROCEDURE — 1159F MED LIST DOCD IN RCRD: CPT | Performed by: FAMILY MEDICINE

## 2022-11-18 PROCEDURE — 99213 OFFICE O/P EST LOW 20 MIN: CPT | Performed by: FAMILY MEDICINE

## 2022-11-18 PROCEDURE — 90662 IIV NO PRSV INCREASED AG IM: CPT | Performed by: FAMILY MEDICINE

## 2022-11-18 PROCEDURE — 81002 URINALYSIS NONAUTO W/O SCOPE: CPT | Performed by: FAMILY MEDICINE

## 2022-11-18 PROCEDURE — G0439 PPPS, SUBSEQ VISIT: HCPCS | Performed by: FAMILY MEDICINE

## 2022-11-18 PROCEDURE — G0008 ADMIN INFLUENZA VIRUS VAC: HCPCS | Performed by: FAMILY MEDICINE

## 2022-11-19 LAB
APPEARANCE UR: ABNORMAL
BACTERIA #/AREA URNS HPF: NORMAL /[HPF]
BILIRUB UR QL STRIP: NEGATIVE
CASTS URNS QL MICRO: NORMAL /LPF
COLOR UR: YELLOW
EPI CELLS #/AREA URNS HPF: NORMAL /HPF (ref 0–10)
GLUCOSE UR QL STRIP: NEGATIVE
HGB UR QL STRIP: NEGATIVE
KETONES UR QL STRIP: NEGATIVE
LEUKOCYTE ESTERASE UR QL STRIP: NEGATIVE
MICRO URNS: ABNORMAL
MICRO URNS: ABNORMAL
NITRITE UR QL STRIP: NEGATIVE
PH UR STRIP: 5.5 [PH] (ref 5–7.5)
PROT UR QL STRIP: NEGATIVE
RBC #/AREA URNS HPF: NORMAL /HPF (ref 0–2)
SP GR UR STRIP: 1.02 (ref 1–1.03)
UROBILINOGEN UR STRIP-MCNC: 0.2 MG/DL (ref 0.2–1)
WBC #/AREA URNS HPF: NORMAL /HPF (ref 0–5)

## 2023-01-03 DIAGNOSIS — E78.2 MIXED HYPERLIPIDEMIA: ICD-10-CM

## 2023-01-03 RX ORDER — ATORVASTATIN CALCIUM 40 MG/1
40 TABLET, FILM COATED ORAL DAILY
Qty: 90 TABLET | Refills: 3 | Status: SHIPPED | OUTPATIENT
Start: 2023-01-03

## 2023-01-27 DIAGNOSIS — F41.9 ANXIETY: ICD-10-CM

## 2023-01-27 RX ORDER — ALPRAZOLAM 0.5 MG/1
0.5 TABLET ORAL 2 TIMES DAILY PRN
Qty: 30 TABLET | Refills: 0 | Status: SHIPPED | OUTPATIENT
Start: 2023-01-27

## 2023-10-24 DIAGNOSIS — E78.2 MIXED HYPERLIPIDEMIA: ICD-10-CM

## 2023-10-24 RX ORDER — ATORVASTATIN CALCIUM 40 MG/1
40 TABLET, FILM COATED ORAL DAILY
Qty: 90 TABLET | Refills: 3 | Status: SHIPPED | OUTPATIENT
Start: 2023-10-24

## 2023-10-27 ENCOUNTER — TRANSCRIBE ORDERS (OUTPATIENT)
Dept: ADMINISTRATIVE | Facility: HOSPITAL | Age: 68
End: 2023-10-27
Payer: MEDICARE

## 2023-10-27 DIAGNOSIS — Z13.9 DUE FOR SCREENING: Primary | ICD-10-CM

## 2023-11-10 ENCOUNTER — HOSPITAL ENCOUNTER (OUTPATIENT)
Dept: MAMMOGRAPHY | Facility: HOSPITAL | Age: 68
Discharge: HOME | End: 2023-11-10
Admitting: FAMILY MEDICINE
Payer: MEDICARE

## 2023-11-10 DIAGNOSIS — Z12.31 SCREENING MAMMOGRAM FOR BREAST CANCER: ICD-10-CM

## 2023-11-10 PROCEDURE — 77067 SCR MAMMO BI INCL CAD: CPT

## 2023-11-10 PROCEDURE — 77063 BREAST TOMOSYNTHESIS BI: CPT

## 2023-11-10 NOTE — PROGRESS NOTES
Called patient  Good afternoon we have your mammogram back and per Dr. Garcia  The breasts are almost entirely fatty, there are no suspicious masses. No mammographic signs of malignancy. Recommend routine mammographic screening in a year.

## 2023-11-13 DIAGNOSIS — R73.9 HYPERGLYCEMIA: ICD-10-CM

## 2023-11-13 DIAGNOSIS — Z00.00 MEDICARE ANNUAL WELLNESS VISIT, SUBSEQUENT: ICD-10-CM

## 2023-11-13 DIAGNOSIS — E78.2 MIXED HYPERLIPIDEMIA: Primary | ICD-10-CM

## 2023-12-01 LAB
ALBUMIN SERPL-MCNC: 4.6 G/DL (ref 3.9–4.9)
ALBUMIN/GLOB SERPL: 1.9 {RATIO} (ref 1.2–2.2)
ALP SERPL-CCNC: 123 IU/L (ref 44–121)
ALT SERPL-CCNC: 25 IU/L (ref 0–32)
AST SERPL-CCNC: 21 IU/L (ref 0–40)
BASOPHILS # BLD AUTO: 0 X10E3/UL (ref 0–0.2)
BASOPHILS NFR BLD AUTO: 1 %
BILIRUB SERPL-MCNC: 0.6 MG/DL (ref 0–1.2)
BUN SERPL-MCNC: 18 MG/DL (ref 8–27)
BUN/CREAT SERPL: 21 (ref 12–28)
CALCIUM SERPL-MCNC: 9.8 MG/DL (ref 8.7–10.3)
CHLORIDE SERPL-SCNC: 102 MMOL/L (ref 96–106)
CHOLEST SERPL-MCNC: 154 MG/DL (ref 100–199)
CHOLEST/HDLC SERPL: 2.7 RATIO (ref 0–4.4)
CO2 SERPL-SCNC: 24 MMOL/L (ref 20–29)
CREAT SERPL-MCNC: 0.84 MG/DL (ref 0.57–1)
EGFRCR SERPLBLD CKD-EPI 2021: 75 ML/MIN/1.73
EOSINOPHIL # BLD AUTO: 0.3 X10E3/UL (ref 0–0.4)
EOSINOPHIL NFR BLD AUTO: 5 %
ERYTHROCYTE [DISTWIDTH] IN BLOOD BY AUTOMATED COUNT: 12.5 % (ref 11.7–15.4)
GLOBULIN SER CALC-MCNC: 2.4 G/DL (ref 1.5–4.5)
GLUCOSE SERPL-MCNC: 108 MG/DL (ref 70–99)
HBA1C MFR BLD: 5.8 % (ref 4.8–5.6)
HCT VFR BLD AUTO: 43 % (ref 34–46.6)
HDLC SERPL-MCNC: 58 MG/DL
HGB BLD-MCNC: 14.1 G/DL (ref 11.1–15.9)
IMM GRANULOCYTES # BLD AUTO: 0 X10E3/UL (ref 0–0.1)
IMM GRANULOCYTES NFR BLD AUTO: 0 %
LDLC SERPL CALC-MCNC: 77 MG/DL (ref 0–99)
LYMPHOCYTES # BLD AUTO: 1.8 X10E3/UL (ref 0.7–3.1)
LYMPHOCYTES NFR BLD AUTO: 34 %
MCH RBC QN AUTO: 30.4 PG (ref 26.6–33)
MCHC RBC AUTO-ENTMCNC: 32.8 G/DL (ref 31.5–35.7)
MCV RBC AUTO: 93 FL (ref 79–97)
MONOCYTES # BLD AUTO: 0.4 X10E3/UL (ref 0.1–0.9)
MONOCYTES NFR BLD AUTO: 7 %
NEUTROPHILS # BLD AUTO: 2.9 X10E3/UL (ref 1.4–7)
NEUTROPHILS NFR BLD AUTO: 53 %
PLATELET # BLD AUTO: 213 X10E3/UL (ref 150–450)
POTASSIUM SERPL-SCNC: 4.6 MMOL/L (ref 3.5–5.2)
PROT SERPL-MCNC: 7 G/DL (ref 6–8.5)
RBC # BLD AUTO: 4.64 X10E6/UL (ref 3.77–5.28)
SODIUM SERPL-SCNC: 140 MMOL/L (ref 134–144)
TRIGL SERPL-MCNC: 108 MG/DL (ref 0–149)
TSH SERPL DL<=0.005 MIU/L-ACNC: 2.86 UIU/ML (ref 0.45–4.5)
VLDLC SERPL CALC-MCNC: 19 MG/DL (ref 5–40)
WBC # BLD AUTO: 5.3 X10E3/UL (ref 3.4–10.8)

## 2023-12-04 ENCOUNTER — HOSPITAL ENCOUNTER (OUTPATIENT)
Dept: CARDIOLOGY | Facility: HOSPITAL | Age: 68
Discharge: HOME | End: 2023-12-04

## 2023-12-04 ENCOUNTER — HOSPITAL ENCOUNTER (OUTPATIENT)
Dept: CT IMAGING | Facility: HOSPITAL | Age: 68
Discharge: HOME | End: 2023-12-04

## 2023-12-04 DIAGNOSIS — Z13.9 DUE FOR SCREENING: ICD-10-CM

## 2023-12-04 LAB
BH CV VAS SCREENING CAROTID CCA LEFT: 76 CM/SEC
BH CV VAS SCREENING CAROTID CCA RIGHT: 55 CM/SEC
BH CV VAS SCREENING CAROTID ICA LEFT: 59 CM/SEC
BH CV VAS SCREENING CAROTID ICA RIGHT: 53 CM/SEC
BH CV XLRA MEAS - MID AO DIAM: 1.9 CM
BH CV XLRA MEAS - PAD LEFT ABI PT: 1.12
BH CV XLRA MEAS - PAD LEFT ARM: 161 MMHG
BH CV XLRA MEAS - PAD LEFT LEG PT: 180 MMHG
BH CV XLRA MEAS - PAD RIGHT ABI PT: 1.17
BH CV XLRA MEAS - PAD RIGHT ARM: 160 MMHG
BH CV XLRA MEAS - PAD RIGHT LEG PT: 189 MMHG
BH CV XLRA MEAS LEFT DIST CCA EDV: -13.7 CM/SEC
BH CV XLRA MEAS LEFT DIST CCA PSV: -75.8 CM/SEC
BH CV XLRA MEAS LEFT ICA/CCA RATIO: 0.8
BH CV XLRA MEAS LEFT PROX ICA EDV: -11.2 CM/SEC
BH CV XLRA MEAS LEFT PROX ICA PSV: -59 CM/SEC
BH CV XLRA MEAS RIGHT DIST CCA EDV: 9.9 CM/SEC
BH CV XLRA MEAS RIGHT DIST CCA PSV: 54.7 CM/SEC
BH CV XLRA MEAS RIGHT ICA/CCA RATIO: 1
BH CV XLRA MEAS RIGHT PROX ICA EDV: -13 CM/SEC
BH CV XLRA MEAS RIGHT PROX ICA PSV: -52.8 CM/SEC

## 2023-12-04 PROCEDURE — 93799 UNLISTED CV SVC/PROCEDURE: CPT

## 2023-12-04 PROCEDURE — 75571 CT HRT W/O DYE W/CA TEST: CPT

## 2023-12-04 NOTE — PROGRESS NOTES
Chief Complaint  Medicare Wellness-subsequent and Hyperlipidemia    Subjective     CC  Problem List  Visit Diagnosis   Encounters  Notes  Medications  Labs  Result Review Imaging  Media    Camille Mendoza presents to [facility] for   Hyperlipidemia  This is a chronic problem. The current episode started more than 1 year ago. The problem is uncontrolled. Recent lipid tests were reviewed and are high. Exacerbating diseases include obesity. She has no history of chronic renal disease, diabetes, hypothyroidism, liver disease or nephrotic syndrome. Factors aggravating her hyperlipidemia include fatty foods. Associated symptoms include leg pain. Pertinent negatives include no chest pain, focal sensory loss, focal weakness, myalgias or shortness of breath. Current antihyperlipidemic treatment includes statins. The current treatment provides mild improvement of lipids. There are no compliance problems.  Risk factors for coronary artery disease include dyslipidemia, family history, post-menopausal and obesity.   Leg Pain   The incident occurred more than 1 week ago (years, worse in the past year). Incident location: No injury, but lot of use for work over the years. There was no injury mechanism. The pain is present in the left knee, right knee, left leg, right leg, left hip and right hip. The quality of the pain is described as aching. The pain is at a severity of 8/10. The pain is moderate. The pain has been Fluctuating since onset. Associated symptoms include numbness and tingling. Pertinent negatives include no inability to bear weight, loss of motion, loss of sensation or muscle weakness. She reports no foreign bodies present. The symptoms are aggravated by weight bearing and movement. She has tried heat, ice and NSAIDs for the symptoms. The treatment provided moderate relief.       Review of Systems   Constitutional:  Negative for activity change, appetite change, fatigue,  "fever, unexpected weight gain and unexpected weight loss.   HENT:  Negative for congestion, ear pain, hearing loss, rhinorrhea, sinus pressure, sore throat, swollen glands, trouble swallowing and voice change.    Eyes:  Negative for visual disturbance.   Respiratory:  Negative for apnea, cough, shortness of breath and wheezing.    Cardiovascular:  Negative for chest pain and palpitations.   Gastrointestinal:  Negative for abdominal pain, blood in stool, constipation, diarrhea, nausea, vomiting and indigestion.   Endocrine: Negative for cold intolerance, heat intolerance, polydipsia and polyuria.   Genitourinary:  Negative for breast discharge, breast lump, breast pain, dysuria, flank pain, frequency, pelvic pain and vaginal bleeding.   Musculoskeletal:  Positive for arthralgias (lower ext). Negative for joint swelling and myalgias.   Skin:  Negative for rash, skin lesions and wound.   Allergic/Immunologic: Negative for environmental allergies and immunocompromised state.   Neurological:  Positive for tingling and numbness. Negative for dizziness, focal weakness, syncope, weakness, headache and memory problem.   Hematological:  Negative for adenopathy. Does not bruise/bleed easily.   Psychiatric/Behavioral:  Negative for suicidal ideas and depressed mood. The patient is not nervous/anxious.         Objective   Vital Signs:   /62 (BP Location: Right arm, Patient Position: Sitting, Cuff Size: Adult)   Pulse 75   Temp 97.1 °F (36.2 °C) (Temporal)   Resp 16   Ht 154.9 cm (61\")   Wt 92.2 kg (203 lb 3.2 oz)   SpO2 98% Comment: room air  BMI 38.39 kg/m²     Physical Exam  Constitutional:       General: She is not in acute distress.     Appearance: She is well-developed.   HENT:      Head: Normocephalic. Hair is normal.      Right Ear: Tympanic membrane and external ear normal.      Left Ear: Tympanic membrane and external ear normal.      Nose: Nose normal. No mucosal edema.      Mouth/Throat:      Pharynx: " Uvula midline.   Eyes:      General:         Right eye: No discharge.         Left eye: No discharge.      Conjunctiva/sclera: Conjunctivae normal.      Pupils: Pupils are equal, round, and reactive to light.   Neck:      Thyroid: No thyromegaly.      Vascular: No JVD.   Cardiovascular:      Rate and Rhythm: Normal rate and regular rhythm.      Chest Wall: PMI is not displaced.      Pulses:           Carotid pulses are 2+ on the right side and 2+ on the left side.       Femoral pulses are 2+ on the right side and 2+ on the left side.       Dorsalis pedis pulses are 2+ on the right side and 2+ on the left side.      Heart sounds: Normal heart sounds. No murmur heard.     No friction rub. No gallop.      Comments: Negative Homans' no edema  Pulmonary:      Effort: Pulmonary effort is normal. No respiratory distress.      Breath sounds: Normal breath sounds. No decreased breath sounds, wheezing, rhonchi or rales.   Chest:   Breasts:     Breasts are symmetrical.      Right: No inverted nipple, mass, nipple discharge, skin change or tenderness.      Left: No inverted nipple, mass, nipple discharge, skin change or tenderness.   Abdominal:      General: Bowel sounds are normal. There is no distension or abdominal bruit.      Palpations: Abdomen is soft. There is no mass.      Tenderness: There is no abdominal tenderness.   Musculoskeletal:         General: No tenderness or deformity. Normal range of motion.      Cervical back: Normal range of motion and neck supple. No muscular tenderness.   Lymphadenopathy:      Cervical: No cervical adenopathy.      Upper Body:      Right upper body: No supraclavicular adenopathy.      Left upper body: No supraclavicular adenopathy.   Skin:     General: Skin is warm and dry.      Findings: No ecchymosis, erythema, lesion or rash.   Neurological:      Mental Status: She is alert and oriented to person, place, and time.      Cranial Nerves: No cranial nerve deficit.      Sensory: No  sensory deficit.      Motor: No abnormal muscle tone.      Coordination: Coordination normal.      Deep Tendon Reflexes: Reflexes are normal and symmetric. Reflexes normal.   Psychiatric:         Speech: Speech normal.         Behavior: Behavior normal.         Thought Content: Thought content normal. Thought content does not include suicidal ideation.         Cognition and Memory: She does not exhibit impaired recent memory or impaired remote memory.         Judgment: Judgment normal. Judgment is not impulsive.        Result Review :Labs  Result Review  Imaging  Med Tab  Media                 Assessment and Plan CC Problem List  Visit Diagnosis  ROS  Review (Popup)  Health Maintenance  Quality  BestPractice  Medications  SmartSets  SnapShot Encounters  Media  Problem List Items Addressed This Visit          High    Paroxysmal supraventricular tachycardia    Overview     Rare sxs off meds          Prediabetes    Overview     A1c 5.8 12/5/2023  Begin Ozempic and follow            Medium    Mixed hyperlipidemia    Overview     Compliant with meds.   Stable         Current Assessment & Plan     Lipid abnormalities are improving with treatment.  Nutritional counseling was provided. and Pharmacotherapy as ordered.  Lipids will be reassessed in 3 months.         History of cardiovascular stress test    Overview     Negative 2021            Low    Gastroesophageal reflux disease without esophagitis    Overview     stable on meds which are continued         Primary osteoarthritis of both knees    Overview     Present stable with minimal sxs and prn use of topical and po anti inflammatories         Seasonal allergic rhinitis due to pollen    Overview     Medication(s) continued usage and side effects discussed.   epi-pen. For severe rxn.  No recent usage            Unprioritized    Osteopenia of multiple sites    Overview     -1.8 spine, -2.3 FN, -1.6 hip, 2022 continue Calcium and wt bearing exerise   -2.0  spine ,-0.5 FN 08/04/2020  -2.3 spine, -1.5 FN, -1.3 hip, 2018   of results -2.4 spine, -1.6 FN, -1.4 hip, 2013  -1.3 spine, -1.3 FN, -0.1 hip, 2011.  -0.8 spine, -0.7 FN, -0.1 hip, 2007  Calcium rich diet and exercise encouraged.  Calcium 1200 mg with vit d daily         Anxiety    Overview     controlled with rare prn meds which are continued.   She is recently . She has a good support network. She is doing well and compliant with meds         Relevant Medications    ALPRAZolam (XANAX) 0.5 MG tablet    Class 2 obesity due to excess calories without serious comorbidity with body mass index (BMI) of 38.0 to 38.9 in adult    Overview     Diet exercise and wt loss discussed. Techniques discussed         Current Assessment & Plan     Patient's (Body mass index is 38.39 kg/m².) indicates that they are obese (BMI >30) with health conditions that include hypertension and dyslipidemias . Weight is unchanged. BMI  is above average; BMI management plan is completed. We discussed low calorie, low carb based diet program, portion control, and increasing exercise.          Medicare annual wellness visit, subsequent - Primary    Overview     Healthy diet and regular exercise. Breast self exams seat belts sunscreens, general safety and fall prevention discussed and encoruaged. Lab discussed in detail.         Relevant Orders    POCT urinalysis dipstick, manual (Completed)    Cervical cancer screening    Overview     Last pap smear 11/2/2015 negative. HPV negative         Encounter for screening mammogram for malignant neoplasm of breast    Overview     Last mammogram 09/16/22, entirely fatty         Colon cancer screening    Overview     Last colonoscopy 01/14/2013 Repeat 2023         Relevant Orders    Cologuard - Stool, Per Rectum     Other Visit Diagnoses       Chronic bilateral low back pain with right-sided sciatica        Relevant Orders    XR Spine Lumbar Complete 4+VW (Completed)    Chronic pain of right knee         Relevant Orders    XR Knee 4+ View Right (Completed)    Need for influenza vaccination        Relevant Orders    Fluzone High-Dose 65+yrs (Completed)            Follow Up Instructions Charge Capture  Follow-up Communications  Return in about 3 months (around 3/5/2024), or if symptoms worsen or fail to improve.  Patient was given instructions and counseling regarding her condition or for health maintenance advice. Please see specific information pulled into the AVS if appropriate.

## 2023-12-04 NOTE — PROGRESS NOTES
The ABCs of the Annual Wellness Visit  Subsequent Medicare Wellness Visit    No chief complaint on file.      Subjective   History of Present Illness:  Camille Mendoza is a 68 y.o. female who presents for a Subsequent Medicare Wellness Visit. The patient is here: for coordination of medical care to discuss health maintenance and disease prevention to follow up on multiple medical problems. Overall has: moderate activity with work/home activities, exercises 2 - 3 times per week, good appetite, feels well with minor complaints, good energy level, and is sleeping well. Nutrition: appropriate balanced diet and eating a variety of foods. Last tetanus shot was   .Camille Mendoza is -3, Parity-0. Patient's last menstrual period was 2003 (approximate). She is postmenopausal.      HPI    HEALTH RISK ASSESSMENT    Recent Hospitalizations:  No hospitalization(s) within the last year.    Current Medical Providers:  Patient Care Team:  Dalila Garcia MD as PCP - General    Smoking Status:  Social History     Tobacco Use   Smoking Status Never   Smokeless Tobacco Never       Alcohol Consumption:  Social History     Substance and Sexual Activity   Alcohol Use Yes       Depression Screen:       2022     2:13 PM   PHQ-2/PHQ-9 Depression Screening   Little Interest or Pleasure in Doing Things 0-->not at all   Feeling Down, Depressed or Hopeless 0-->not at all   PHQ-9: Brief Depression Severity Measure Score 0       I spent less than 16 minutes asking patient questions, counseling and documenting in the chart    Fall Risk Screen:  STEADI Fall Risk Assessment has not been completed.    Health Habits and Functional and Cognitive Screenin/18/2022     2:12 PM   Functional & Cognitive Status   Do you have difficulty preparing food and eating? No   Do you have difficulty bathing yourself, getting dressed or grooming yourself? No   Do you have difficulty using the toilet? No   Do you have difficulty  moving around from place to place? No   Do you have trouble with steps or getting out of a bed or a chair? No   Current Diet Well Balanced Diet   Dental Exam Up to date   Eye Exam Not up to date   Exercise (times per week) 6 times per week   Current Exercises Include Walking;Yard Work;Other   Do you need help using the phone?  No   Are you deaf or do you have serious difficulty hearing?  No   Do you need help to go to places out of walking distance? No   Do you need help shopping? No   Do you need help preparing meals?  No   Do you need help with housework?  No   Do you need help with laundry? No   Do you need help taking your medications? No   Do you need help managing money? No   Have you felt unusual stress, anger or loneliness in the last month? No   Who do you live with? Alone   If you need help, do you have trouble finding someone available to you? No   Have you been bothered in the last four weeks by sexual problems? No   Do you have difficulty concentrating, remembering or making decisions? No       Does the patient have evidence of cognitive impairment? No    Asprin use counseling:Taking ASA appropriately as indicated    Recent Lab Results:  Lab Results   Component Value Date    CHLPL 154 11/30/2023    TRIG 108 11/30/2023    HDL 58 11/30/2023    LDL 77 11/30/2023    VLDL 19 11/30/2023    HGBA1C 5.8 (H) 11/30/2023        Age-appropriate Screening Schedule:  Refer to the list below for future screening recommendations based on patient's age, sex and/or medical conditions. Orders for these recommended tests are listed in the plan section. The patient has been provided with a written plan.    Health Maintenance   Topic Date Due    ZOSTER VACCINE (2 of 3) 02/25/2013    TDAP/TD VACCINES (2 - Td or Tdap) 12/24/2022    COLORECTAL CANCER SCREENING  01/14/2023    INFLUENZA VACCINE  08/01/2023    COVID-19 Vaccine (3 - 2023-24 season) 09/01/2023    ANNUAL WELLNESS VISIT  11/18/2023    DXA SCAN  09/16/2024    LIPID  PANEL  11/30/2024    MAMMOGRAM  11/10/2025    HEPATITIS C SCREENING  Completed    Pneumococcal Vaccine 65+  Completed          The following portions of the patient's history were reviewed and updated as appropriate: allergies, current medications, past family history, past medical history, past social history, past surgical history, and problem list.    Outpatient Medications Prior to Visit   Medication Sig Dispense Refill    ALPRAZolam (XANAX) 0.5 MG tablet Take 1 tablet by mouth 2 (Two) Times a Day As Needed for Anxiety. Use sparingly. 30 tablet 0    aspirin 81 MG EC tablet Take 81 mg by mouth Daily.      atorvastatin (LIPITOR) 40 MG tablet TAKE 1 TABLET BY MOUTH DAILY 90 tablet 3    diphenhydrAMINE (BENADRYL) 25 MG tablet Take 25 mg by mouth Every 6 (Six) Hours As Needed for Itching.      Melatonin 10 MG capsule Take 1 capsule by mouth Every Night.      omeprazole (priLOSEC) 20 MG capsule Take 20 mg by mouth Daily.      Probiotic Product (PRO-BIOTIC BLEND PO) Take  by mouth.      triamcinolone (KENALOG) 0.1 % cream Apply  topically to the appropriate area as directed 2 (Two) Times a Day. 60 g 1    Turmeric (QC TUMERIC COMPLEX PO) Take  by mouth.       No facility-administered medications prior to visit.       Patient Active Problem List   Diagnosis    Gastroesophageal reflux disease without esophagitis    Mixed hyperlipidemia    Osteopenia of multiple sites    Paroxysmal supraventricular tachycardia    Primary osteoarthritis of both knees    Seasonal allergic rhinitis due to pollen    Anxiety    Class 2 obesity due to excess calories without serious comorbidity with body mass index (BMI) of 38.0 to 38.9 in adult    Medicare annual wellness visit, subsequent    Cervical cancer screening    Encounter for screening mammogram for malignant neoplasm of breast    Colon cancer screening       Advanced Care Planning:  ACP discussion was held with the patient during this visit. Patient has an advance directive (not in  EMR), copy requested.    A face-to-face visit was completed today with patient.  Counseling explanation, and discussion of advanced directives was performed.   The last advanced care visit was performed in 2022.  In a near life ending situation, from which she is not expected to recover functionally, and she is not able to speak for herself, she does not want life sustaining measures. We discussed feeding tubes, ventilators and cardiac support as well as dialysis.    I spent more than 16 minutes discussing Advanced Care Planning information and documenting in the chart.    Review of Systems    I have reviewed and confirmed the accuracy of the HPI and ROS as documented by the MA/LPN/RN El Patterson MA    Compared to one year ago, the patient feels her physical health is the same.  Compared to one year ago, the patient feels her mental health is the same.    Reviewed chart for potential of high risk medication in the elderly: yes  Reviewed chart for potential of harmful drug interactions in the elderly:yes    Objective      There were no vitals filed for this visit.    There is no height or weight on file to calculate BMI.  Discussed the patient's BMI with her. The BMI is above average; BMI management plan is completed.      Assessment    Medicare Risks and Personalized Health Plan  CMS Preventative Services Quick Reference  Cardiovascular risk    The above risks/problems have been discussed with the patient.  Pertinent information has been shared with the patient in the After Visit Summary.  Follow up plans and orders are seen below in the Assessment/Plan Section.    Problem List Items Addressed This Visit          Cardiac and Vasculature    Mixed hyperlipidemia    Overview     Compliant with meds.   Stable            Endocrine and Metabolic    Class 2 obesity due to excess calories without serious comorbidity with body mass index (BMI) of 38.0 to 38.9 in adult    Overview     Diet exercise and wt loss discussed.  Techniques discussed            Gastrointestinal Abdominal     Colon cancer screening    Overview     Last colonoscopy 01/14/2013 Repeat 2023            Genitourinary and Reproductive     Cervical cancer screening    Overview     Last pap smear 11/2/2015 negative. HPV negative         Encounter for screening mammogram for malignant neoplasm of breast    Overview     Last mammogram 09/16/22, entirely fatty            Health Encounters    Medicare annual wellness visit, subsequent - Primary    Overview     Healthy diet and regular exercise. Breast self exams seat belts sunscreens, general safety and fall prevention discussed and encoruaged. Lab discussed in detail.             Mental Health    Anxiety    Overview     controlled with rare prn meds which are continued.   She is recently . She has a good support network. She is doing well and compliant with meds.             Musculoskeletal and Injuries    Osteopenia of multiple sites    Overview     -1.8 spine, -2.3 FN, -1.6 hip, 2022 08/04/2020 -2.0 spine ,-0.5 FN   -2.3 spine, -1.5 FN, -1.3 hip, 2018 bone density repeated, notify her of results   -2.4 spine, -1.6 FN, -1.4 hip, 2013  -1.3 spine, -1.3 FN, -0.1 hip, 2011.  -0.8 spine, -0.7 FN, -0.1 hip, 2007  Calcium rich diet and exercise encouraged.  Calcium 1200 mg with vit d daily          Follow Up:  No follow-ups on file.     An After Visit Summary and PPPS were given to the patient.

## 2023-12-05 ENCOUNTER — OFFICE VISIT (OUTPATIENT)
Dept: FAMILY MEDICINE CLINIC | Facility: CLINIC | Age: 68
End: 2023-12-05
Payer: MEDICARE

## 2023-12-05 ENCOUNTER — HOSPITAL ENCOUNTER (OUTPATIENT)
Dept: GENERAL RADIOLOGY | Facility: HOSPITAL | Age: 68
Discharge: HOME | End: 2023-12-05
Payer: MEDICARE

## 2023-12-05 ENCOUNTER — HOSPITAL ENCOUNTER (OUTPATIENT)
Dept: GENERAL RADIOLOGY | Facility: HOSPITAL | Age: 68
Discharge: HOME | End: 2023-12-05
Admitting: FAMILY MEDICINE
Payer: MEDICARE

## 2023-12-05 VITALS
HEART RATE: 75 BPM | SYSTOLIC BLOOD PRESSURE: 120 MMHG | DIASTOLIC BLOOD PRESSURE: 62 MMHG | TEMPERATURE: 97.1 F | OXYGEN SATURATION: 98 % | BODY MASS INDEX: 38.36 KG/M2 | RESPIRATION RATE: 16 BRPM | WEIGHT: 203.2 LBS | HEIGHT: 61 IN

## 2023-12-05 DIAGNOSIS — G89.29 CHRONIC BILATERAL LOW BACK PAIN WITH RIGHT-SIDED SCIATICA: ICD-10-CM

## 2023-12-05 DIAGNOSIS — M17.0 PRIMARY OSTEOARTHRITIS OF BOTH KNEES: ICD-10-CM

## 2023-12-05 DIAGNOSIS — E78.2 MIXED HYPERLIPIDEMIA: ICD-10-CM

## 2023-12-05 DIAGNOSIS — M54.41 CHRONIC BILATERAL LOW BACK PAIN WITH RIGHT-SIDED SCIATICA: ICD-10-CM

## 2023-12-05 DIAGNOSIS — E66.09 CLASS 2 OBESITY DUE TO EXCESS CALORIES WITHOUT SERIOUS COMORBIDITY WITH BODY MASS INDEX (BMI) OF 38.0 TO 38.9 IN ADULT: ICD-10-CM

## 2023-12-05 DIAGNOSIS — K21.9 GASTROESOPHAGEAL REFLUX DISEASE WITHOUT ESOPHAGITIS: ICD-10-CM

## 2023-12-05 DIAGNOSIS — G89.29 CHRONIC PAIN OF RIGHT KNEE: ICD-10-CM

## 2023-12-05 DIAGNOSIS — R73.03 PREDIABETES: ICD-10-CM

## 2023-12-05 DIAGNOSIS — Z92.89 HISTORY OF CARDIOVASCULAR STRESS TEST: ICD-10-CM

## 2023-12-05 DIAGNOSIS — Z12.31 ENCOUNTER FOR SCREENING MAMMOGRAM FOR MALIGNANT NEOPLASM OF BREAST: ICD-10-CM

## 2023-12-05 DIAGNOSIS — Z00.00 MEDICARE ANNUAL WELLNESS VISIT, SUBSEQUENT: Primary | ICD-10-CM

## 2023-12-05 DIAGNOSIS — Z12.4 CERVICAL CANCER SCREENING: ICD-10-CM

## 2023-12-05 DIAGNOSIS — M85.89 OSTEOPENIA OF MULTIPLE SITES: ICD-10-CM

## 2023-12-05 DIAGNOSIS — I47.10 PAROXYSMAL SUPRAVENTRICULAR TACHYCARDIA: ICD-10-CM

## 2023-12-05 DIAGNOSIS — Z23 NEED FOR INFLUENZA VACCINATION: ICD-10-CM

## 2023-12-05 DIAGNOSIS — F41.9 ANXIETY: ICD-10-CM

## 2023-12-05 DIAGNOSIS — J30.1 SEASONAL ALLERGIC RHINITIS DUE TO POLLEN: ICD-10-CM

## 2023-12-05 DIAGNOSIS — M25.561 CHRONIC PAIN OF RIGHT KNEE: ICD-10-CM

## 2023-12-05 DIAGNOSIS — Z12.11 COLON CANCER SCREENING: ICD-10-CM

## 2023-12-05 PROBLEM — R93.1 ELEVATED CORONARY ARTERY CALCIUM SCORE: Status: ACTIVE | Noted: 2023-12-05

## 2023-12-05 LAB
BILIRUB BLD-MCNC: NEGATIVE MG/DL
CLARITY, POC: CLEAR
COLOR UR: YELLOW
GLUCOSE UR STRIP-MCNC: NEGATIVE MG/DL
KETONES UR QL: NEGATIVE
LEUKOCYTE EST, POC: NEGATIVE
NITRITE UR-MCNC: NEGATIVE MG/ML
PH UR: 5 [PH] (ref 5–8)
PROT UR STRIP-MCNC: NEGATIVE MG/DL
RBC # UR STRIP: NEGATIVE /UL
SP GR UR: 1.03 (ref 1–1.03)
UROBILINOGEN UR QL: NORMAL

## 2023-12-05 PROCEDURE — 72110 X-RAY EXAM L-2 SPINE 4/>VWS: CPT

## 2023-12-05 PROCEDURE — 73564 X-RAY EXAM KNEE 4 OR MORE: CPT

## 2023-12-05 RX ORDER — ALPRAZOLAM 0.5 MG/1
0.5 TABLET ORAL 2 TIMES DAILY PRN
Qty: 30 TABLET | Refills: 0 | Status: SHIPPED | OUTPATIENT
Start: 2023-12-05

## 2023-12-05 NOTE — PROGRESS NOTES
Addended by: DOUG TAY on: 9/8/2020 04:06 PM     Modules accepted: Level of Service     Reviewed with patient at appointment on 12/05/2023

## 2023-12-06 NOTE — PROGRESS NOTES
Call patient  Good afternoon we have your knee xray back and per Dr. Garcia  Knee xray is showing mild arthritis, however this is not unusual for your age.

## 2023-12-06 NOTE — PROGRESS NOTES
Call patient  Good afternoon we have your lumbar xray back and per Dr. Garcia  Your lumbar xray is showing moderate age related arthritis at several levels.

## 2023-12-18 NOTE — ASSESSMENT & PLAN NOTE
Patient's (Body mass index is 38.39 kg/m².) indicates that they are obese (BMI >30) with health conditions that include hypertension and dyslipidemias . Weight is unchanged. BMI  is above average; BMI management plan is completed. We discussed low calorie, low carb based diet program, portion control, and increasing exercise.

## 2024-10-03 ENCOUNTER — OFFICE VISIT (OUTPATIENT)
Age: 69
End: 2024-10-03
Payer: MEDICARE

## 2024-10-03 ENCOUNTER — HOSPITAL ENCOUNTER (OUTPATIENT)
Dept: GENERAL RADIOLOGY | Facility: HOSPITAL | Age: 69
Discharge: HOME | End: 2024-10-03
Admitting: NURSE PRACTITIONER
Payer: MEDICARE

## 2024-10-03 VITALS — HEIGHT: 61 IN | WEIGHT: 203 LBS | BODY MASS INDEX: 38.33 KG/M2

## 2024-10-03 DIAGNOSIS — G89.29 CHRONIC PAIN OF BOTH KNEES: Primary | ICD-10-CM

## 2024-10-03 DIAGNOSIS — M25.562 CHRONIC PAIN OF BOTH KNEES: Primary | ICD-10-CM

## 2024-10-03 DIAGNOSIS — M17.0 PRIMARY OSTEOARTHRITIS OF BOTH KNEES: Primary | ICD-10-CM

## 2024-10-03 DIAGNOSIS — M25.561 CHRONIC PAIN OF BOTH KNEES: Primary | ICD-10-CM

## 2024-10-03 PROCEDURE — 73564 X-RAY EXAM KNEE 4 OR MORE: CPT

## 2024-10-03 RX ORDER — TRIAMCINOLONE ACETONIDE 40 MG/ML
80 INJECTION, SUSPENSION INTRA-ARTICULAR; INTRAMUSCULAR
Status: COMPLETED | OUTPATIENT
Start: 2024-10-03 | End: 2024-10-03

## 2024-10-03 RX ORDER — LIDOCAINE HYDROCHLORIDE 10 MG/ML
2 INJECTION, SOLUTION INFILTRATION; PERINEURAL
Status: COMPLETED | OUTPATIENT
Start: 2024-10-03 | End: 2024-10-03

## 2024-10-03 RX ADMIN — TRIAMCINOLONE ACETONIDE 80 MG: 40 INJECTION, SUSPENSION INTRA-ARTICULAR; INTRAMUSCULAR at 15:51

## 2024-10-03 RX ADMIN — TRIAMCINOLONE ACETONIDE 80 MG: 40 INJECTION, SUSPENSION INTRA-ARTICULAR; INTRAMUSCULAR at 15:52

## 2024-10-03 RX ADMIN — LIDOCAINE HYDROCHLORIDE 2 ML: 10 INJECTION, SOLUTION INFILTRATION; PERINEURAL at 15:52

## 2024-10-03 RX ADMIN — LIDOCAINE HYDROCHLORIDE 2 ML: 10 INJECTION, SOLUTION INFILTRATION; PERINEURAL at 15:51

## 2024-10-03 NOTE — PROGRESS NOTES
[facility]        Patient Name: Camille Mendoza  : 1955  Primary Care Physician: Dalila Garcia MD        Chief Complaint:    Chief Complaint   Patient presents with    Left Knee - Pain, Initial Evaluation     Pain for years    Ongoing pain,   Pain when  standing hurts     Right Knee - Pain, Initial Evaluation     Right knee is worst when walking   Bakers cyst            HPI:   Camille Mendoza is a 69 y.o. year old who presents today for evaluation of bilateral knee pain.    Onset of pain was years ago. She has a history of left meniscus tear in 2019 that was repaired surgically. She denies any recent injury or trauma to the knees. She has significant stiffness after sitting and with standing up from a seated position. She is very active and continues to work. She climbs ladders at work and that is difficult due to knee pain. She takes Aleve PRN. She does have gastritis symptoms with NSAID use and prefers to avoid.         Past Medical/Surgical, Social and Family History:  I have reviewed and/or updated pertinent history as noted in the medical record including:  Past Medical History:   Diagnosis Date    Anxiety     Gastroesophageal reflux disease without esophagitis     Menopausal syndrome     Mixed hyperlipidemia     Osteopenia of multiple sites     Paroxysmal supraventricular tachycardia     Primary osteoarthritis of both knees     Seasonal allergic rhinitis due to pollen      Past Surgical History:   Procedure Laterality Date    CARDIAC CATHETERIZATION  2007    Dr. Paris    CATARACT EXTRACTION, BILATERAL      [facility] [place]    DILATION AND CURETTAGE, DIAGNOSTIC / THERAPEUTIC      KNEE ARTHROSCOPY Left 2019    [facility] with partial medial and lateral meniscectomy    TONSILLECTOMY AND ADENOIDECTOMY       Social History     Occupational History    Not on file   Tobacco Use    Smoking status: Never     Passive exposure: Never    Smokeless tobacco: Never   Vaping Use    Vaping  status: Never Used   Substance and Sexual Activity    Alcohol use: Yes    Drug use: No    Sexual activity: Defer          Allergies:   Allergies   Allergen Reactions    Coffee Nausea Only and Palpitations    Meat Extract Hives     TICK Borne Allergy, hives       Medications:   Home Medications:  Current Outpatient Medications on File Prior to Visit   Medication Sig    ALPRAZolam (XANAX) 0.5 MG tablet Take 1 tablet by mouth 2 (Two) Times a Day As Needed for Anxiety. Use sparingly.    aspirin 81 MG EC tablet Take 1 tablet by mouth Daily.    atorvastatin (LIPITOR) 40 MG tablet TAKE 1 TABLET BY MOUTH DAILY    diphenhydrAMINE (BENADRYL) 25 MG tablet Take 1 tablet by mouth Every 6 (Six) Hours As Needed for Itching.    Melatonin 10 MG capsule Take 1 capsule by mouth Every Night.    omeprazole (priLOSEC) 20 MG capsule Take 1 capsule by mouth Daily.    triamcinolone (KENALOG) 0.1 % cream Apply  topically to the appropriate area as directed 2 (Two) Times a Day. (Patient taking differently: Apply  topically to the appropriate area as directed 2 (Two) Times a Day. As needed)    Probiotic Product (PRO-BIOTIC BLEND PO) Take  by mouth. (Patient not taking: Reported on 10/3/2024)    Turmeric (QC TUMERIC COMPLEX PO) Take  by mouth. (Patient not taking: Reported on 12/5/2023)     No current facility-administered medications on file prior to visit.         ROS:  Negative unless listed in the HPI    Physical Exam:   69 y.o. female  Body mass index is 38.36 kg/m²., 92.1 kg (203 lb)  There were no vitals filed for this visit.  General: Alert, cooperative, appears well and in no observable distress.   HEENT: Normocephalic, atraumatic on external visual inspection. No icterus.   CV: No significant peripheral edema.    Respiratory: Normal respiratory effort.   Skin: Warm & well perfused; appropriate skin turgor.  Psych: Appropriate mood & affect.  Neuro: Gross sensation and motor intact in affected extremity/extremities.  Vascular:  Peripheral pulses palpable in affected extremity/extremities.     Ortho Exam   Left/right knee. Patient has crepitus throughout range of motion. Positive patellar grind test. Mild effusion. Significant joint line tenderness is noted on the medial aspect of the knee. Patient has a varus orientation of the knee. There is fullness and tenderness in the Popliteal fossa. Mild distention of a Popliteal cyst is noted in this location. Range of motion in flexion is from 0-120 degrees. The patient has a significant limp with the first few steps after starting the gait cycle. Getting out of a chair takes a lot of effort due to pain on knee flexion.       Radiology:        Large Joint Arthrocentesis: R knee  Date/Time: 10/3/2024 3:51 PM  Consent given by: patient  Site marked: site marked  Timeout: Immediately prior to procedure a time out was called to verify the correct patient, procedure, equipment, support staff and site/side marked as required   Supporting Documentation  Indications: pain and diagnostic evaluation   Procedure Details  Location: knee - R knee  Needle size: 25 G  Approach: anterolateral  Medications administered: 2 mL lidocaine 1 %; 80 mg triamcinolone acetonide 40 MG/ML  Patient tolerance: patient tolerated the procedure well with no immediate complications      Large Joint Arthrocentesis: L knee  Date/Time: 10/3/2024 3:52 PM  Consent given by: patient  Site marked: site marked  Timeout: Immediately prior to procedure a time out was called to verify the correct patient, procedure, equipment, support staff and site/side marked as required   Supporting Documentation  Indications: pain and diagnostic evaluation   Procedure Details  Location: knee - L knee  Needle size: 25 G  Approach: anterolateral  Medications administered: 2 mL lidocaine 1 %; 80 mg triamcinolone acetonide 40 MG/ML  Patient tolerance: patient tolerated the procedure well with no immediate complications          Assessment:  Osteoarthritis,  bilateral knees   Body mass index is 38.36 kg/m².  BMI consistent with Obese Class II: 35-39.9kg/m2           Plan:  I have reviewed the above imaging with the patient today    Discussed conservative treatment options including NSAIDs, topical pain creams and steroid injections    She has some GI distress with NSAIDs and would prefer to avoid    She is a very active individual and I don't think a topical pain cream would be as effective    Discussed steroid injections and risk/benefits. She would like to proceed. Please see documentation above.     Follow up in 3 months or PRN for repeat injections     Patient encouraged to call with any questions or concerns in the interim        DALILA Farris

## 2024-11-04 DIAGNOSIS — Z13.6 SCREENING FOR CARDIOVASCULAR CONDITION: Primary | ICD-10-CM

## 2024-11-04 DIAGNOSIS — E78.2 MIXED HYPERLIPIDEMIA: ICD-10-CM

## 2024-11-04 DIAGNOSIS — Z12.31 ENCOUNTER FOR SCREENING MAMMOGRAM FOR MALIGNANT NEOPLASM OF BREAST: ICD-10-CM

## 2024-11-04 DIAGNOSIS — R73.03 PREDIABETES: ICD-10-CM

## 2024-11-21 ENCOUNTER — HOSPITAL ENCOUNTER (OUTPATIENT)
Dept: ULTRASOUND IMAGING | Facility: HOSPITAL | Age: 69
Discharge: HOME | End: 2024-11-21

## 2024-11-21 ENCOUNTER — HOSPITAL ENCOUNTER (OUTPATIENT)
Dept: CT IMAGING | Facility: HOSPITAL | Age: 69
Discharge: HOME | End: 2024-11-21

## 2024-11-21 ENCOUNTER — HOSPITAL ENCOUNTER (OUTPATIENT)
Dept: MAMMOGRAPHY | Facility: HOSPITAL | Age: 69
Discharge: HOME | End: 2024-11-21
Admitting: FAMILY MEDICINE
Payer: MEDICARE

## 2024-11-21 DIAGNOSIS — Z13.6 SCREENING FOR CARDIOVASCULAR CONDITION: ICD-10-CM

## 2024-11-21 DIAGNOSIS — Z12.31 ENCOUNTER FOR SCREENING MAMMOGRAM FOR MALIGNANT NEOPLASM OF BREAST: ICD-10-CM

## 2024-11-21 LAB
BH CV LEA LEFT PTA DISTAL PSV: 77.9 CM/S
BH CV LEA RIGHT PTA DISTAL PSV: 34.5 CM/S
BH CV VAS SCREENING CAROTID CCA LEFT: 60.2 CM/SEC
BH CV VAS SCREENING CAROTID CCA RIGHT: 53.6 CM/SEC
BH CV VAS SCREENING CAROTID ICA LEFT: 27.7 CM/SEC
BH CV VAS SCREENING CAROTID ICA RIGHT: 50.3 CM/SEC
BH CV XLRA MEAS - MID AO DIAM: 2 CM
BH CV XLRA MEAS - PAD LEFT ABI PT: 1
BH CV XLRA MEAS - PAD LEFT ARM: 178 MMHG
BH CV XLRA MEAS - PAD LEFT LEG PT: 172 MMHG
BH CV XLRA MEAS - PAD RIGHT ABI PT: 1.1
BH CV XLRA MEAS - PAD RIGHT ARM: 151 MMHG
BH CV XLRA MEAS - PAD RIGHT LEG PT: 191 MMHG
BH CV XLRA MEAS LEFT DIST CCA EDV: 15.1 CM/SEC
BH CV XLRA MEAS LEFT DIST CCA PSV: 60.2 CM/SEC
BH CV XLRA MEAS LEFT ICA/CCA RATIO: 0.5
BH CV XLRA MEAS LEFT PROX ICA EDV: 8.4 CM/SEC
BH CV XLRA MEAS LEFT PROX ICA PSV: 27.7 CM/SEC
BH CV XLRA MEAS RIGHT DIST CCA EDV: 11.6 CM/SEC
BH CV XLRA MEAS RIGHT DIST CCA PSV: 53.6 CM/SEC
BH CV XLRA MEAS RIGHT ICA/CCA RATIO: 0.9

## 2024-11-21 PROCEDURE — 77067 SCR MAMMO BI INCL CAD: CPT

## 2024-11-21 PROCEDURE — 93799 UNLISTED CV SVC/PROCEDURE: CPT

## 2024-11-21 PROCEDURE — 77063 BREAST TOMOSYNTHESIS BI: CPT

## 2024-11-21 PROCEDURE — 75571 CT HRT W/O DYE W/CA TEST: CPT

## 2024-11-22 LAB
ALBUMIN SERPL-MCNC: 4.7 G/DL (ref 3.8–4.8)
ALP SERPL-CCNC: 115 IU/L (ref 44–121)
ALT SERPL-CCNC: 15 IU/L (ref 0–32)
AST SERPL-CCNC: 15 IU/L (ref 0–40)
BASOPHILS # BLD AUTO: 0 X10E3/UL (ref 0–0.2)
BASOPHILS NFR BLD AUTO: 0 %
BILIRUB SERPL-MCNC: 0.8 MG/DL (ref 0–1.2)
BUN SERPL-MCNC: 11 MG/DL (ref 8–27)
BUN/CREAT SERPL: 14 (ref 12–28)
CALCIUM SERPL-MCNC: 9.9 MG/DL (ref 8.7–10.3)
CHLORIDE SERPL-SCNC: 102 MMOL/L (ref 96–106)
CHOLEST SERPL-MCNC: 177 MG/DL (ref 100–199)
CHOLEST/HDLC SERPL: 2.5 RATIO (ref 0–4.4)
CO2 SERPL-SCNC: 23 MMOL/L (ref 20–29)
CREAT SERPL-MCNC: 0.8 MG/DL (ref 0.57–1)
EGFRCR SERPLBLD CKD-EPI 2021: 79 ML/MIN/1.73
EOSINOPHIL # BLD AUTO: 0.1 X10E3/UL (ref 0–0.4)
EOSINOPHIL NFR BLD AUTO: 2 %
ERYTHROCYTE [DISTWIDTH] IN BLOOD BY AUTOMATED COUNT: 12.9 % (ref 11.7–15.4)
GLOBULIN SER CALC-MCNC: 2.3 G/DL (ref 1.5–4.5)
GLUCOSE SERPL-MCNC: 103 MG/DL (ref 70–99)
HBA1C MFR BLD: 6.1 % (ref 4.8–5.6)
HCT VFR BLD AUTO: 43.3 % (ref 34–46.6)
HDLC SERPL-MCNC: 71 MG/DL
HGB BLD-MCNC: 14.2 G/DL (ref 11.1–15.9)
IMM GRANULOCYTES # BLD AUTO: 0 X10E3/UL (ref 0–0.1)
IMM GRANULOCYTES NFR BLD AUTO: 0 %
LDLC SERPL CALC-MCNC: 80 MG/DL (ref 0–99)
LYMPHOCYTES # BLD AUTO: 1.9 X10E3/UL (ref 0.7–3.1)
LYMPHOCYTES NFR BLD AUTO: 27 %
MCH RBC QN AUTO: 30.7 PG (ref 26.6–33)
MCHC RBC AUTO-ENTMCNC: 32.8 G/DL (ref 31.5–35.7)
MCV RBC AUTO: 94 FL (ref 79–97)
MONOCYTES # BLD AUTO: 0.4 X10E3/UL (ref 0.1–0.9)
MONOCYTES NFR BLD AUTO: 6 %
NEUTROPHILS # BLD AUTO: 4.5 X10E3/UL (ref 1.4–7)
NEUTROPHILS NFR BLD AUTO: 65 %
PLATELET # BLD AUTO: 249 X10E3/UL (ref 150–450)
POTASSIUM SERPL-SCNC: 4.3 MMOL/L (ref 3.5–5.2)
PROT SERPL-MCNC: 7 G/DL (ref 6–8.5)
RBC # BLD AUTO: 4.62 X10E6/UL (ref 3.77–5.28)
SODIUM SERPL-SCNC: 137 MMOL/L (ref 134–144)
TRIGL SERPL-MCNC: 154 MG/DL (ref 0–149)
TSH SERPL DL<=0.005 MIU/L-ACNC: 2.63 UIU/ML (ref 0.45–4.5)
VLDLC SERPL CALC-MCNC: 26 MG/DL (ref 5–40)
WBC # BLD AUTO: 7.1 X10E3/UL (ref 3.4–10.8)

## 2024-11-22 NOTE — PROGRESS NOTES
Called patient about mammogram   Good morning we have your mammogram back and per Dr. Garcia   The breasts are almost entirely fatty, No suspicious masses,  No mammographic evidence of malignancy.  Recommend annual screening  Mammography in 1 year.

## 2024-11-27 NOTE — PROGRESS NOTES
Subjective   The ABCs of the Annual Wellness Visit  Medicare Wellness Visit      Camille Mendoza is a 69 y.o. patient who presents for a Medicare Wellness Visit.    The following portions of the patient's history were reviewed and   updated as appropriate: allergies, current medications, past family history, past medical history, past social history, past surgical history, and problem list.    Compared to one year ago, the patient's physical   health is the same.  Compared to one year ago, the patient's mental   health is the same.    Recent Hospitalizations:  She was not admitted to the hospital during the last year.     Current Medical Providers:  Patient Care Team:  Dalila Garcia MD as PCP - General    Outpatient Medications Prior to Visit   Medication Sig Dispense Refill    aspirin 81 MG EC tablet Take 1 tablet by mouth Daily.      atorvastatin (LIPITOR) 40 MG tablet TAKE 1 TABLET BY MOUTH DAILY 90 tablet 3    diphenhydrAMINE (BENADRYL) 25 MG tablet Take 1 tablet by mouth Every 6 (Six) Hours As Needed for Itching.      Melatonin 10 MG capsule Take 1 capsule by mouth Every Night.      omeprazole (priLOSEC) 20 MG capsule Take 1 capsule by mouth Daily.      ALPRAZolam (XANAX) 0.5 MG tablet Take 1 tablet by mouth 2 (Two) Times a Day As Needed for Anxiety. Use sparingly. 30 tablet 0    triamcinolone (KENALOG) 0.1 % cream Apply  topically to the appropriate area as directed 2 (Two) Times a Day. (Patient taking differently: Apply  topically to the appropriate area as directed 2 (Two) Times a Day. As needed) 60 g 1    Probiotic Product (PRO-BIOTIC BLEND PO) Take  by mouth. (Patient not taking: Reported on 12/2/2024)      Turmeric (QC TUMERIC COMPLEX PO) Take  by mouth. (Patient not taking: Reported on 12/5/2023)       No facility-administered medications prior to visit.     No opioid medication identified on active medication list. I have reviewed chart for other potential  high risk medication/s and harmful drug  "interactions in the elderly.      Aspirin is on active medication list. Aspirin use is indicated based on review of current medical condition/s. Pros and cons of this therapy have been discussed today. Benefits of this medication outweigh potential harm.  Patient has been encouraged to continue taking this medication.  .      Patient Active Problem List   Diagnosis    Gastroesophageal reflux disease without esophagitis    Mixed hyperlipidemia    Osteopenia of multiple sites    Paroxysmal supraventricular tachycardia    Primary osteoarthritis of both knees    Seasonal allergic rhinitis due to pollen    Anxiety    Class 2 obesity due to excess calories without serious comorbidity with body mass index (BMI) of 38.0 to 38.9 in adult    Medicare annual wellness visit, subsequent    Cervical cancer screening    Encounter for screening mammogram for malignant neoplasm of breast    Colon cancer screening    History of cardiovascular stress test    Elevated coronary artery calcium score    Prediabetes     Advance Care Planning Advance Directive is not on file.  ACP discussion was held with the patient during this visit. Patient has an advance directive (not in EMR), copy requested.            Objective   Vitals:    12/02/24 0820   BP: 120/84   BP Location: Right arm   Patient Position: Sitting   Cuff Size: Large Adult   Pulse: 84   Resp: 18   Temp: 97.5 °F (36.4 °C)   TempSrc: Temporal   SpO2: 95%  Comment: room air   Weight: 90.4 kg (199 lb 3.2 oz)   Height: 154.9 cm (61\")   PainSc: 0-No pain       Estimated body mass index is 37.64 kg/m² as calculated from the following:    Height as of this encounter: 154.9 cm (61\").    Weight as of this encounter: 90.4 kg (199 lb 3.2 oz).            Does the patient have evidence of cognitive impairment? No  Lab Results   Component Value Date    CHLPL 177 11/21/2024    TRIG 154 (H) 11/21/2024    HDL 71 11/21/2024    LDL 80 11/21/2024    VLDL 26 11/21/2024    HGBA1C 6.1 (H) 11/21/2024 "       ECG 12 Lead    Date/Time: 2024 4:59 PM  Performed by: Dalila Garcia MD    Authorized by: Dalila Garcia MD  Comparison: not compared with previous ECG   Previous ECG: no previous ECG available  Rhythm: sinus rhythm  Rate: bradycardic  BPM: 51  Conduction: conduction normal  ST Segments: ST segments normal  T Waves: T waves normal  QRS axis: normal  Other: no other findings    Clinical impression: non-specific ECG                                                                                                Health  Risk Assessment    Smoking Status:  Social History     Tobacco Use   Smoking Status Never    Passive exposure: Never   Smokeless Tobacco Never     Alcohol Consumption:  Social History     Substance and Sexual Activity   Alcohol Use Yes       Fall Risk Screen  STEADI Fall Risk Assessment was completed, and patient is at LOW risk for falls.Assessment completed on:2024    Depression Screening   Little interest or pleasure in doing things? Not at all   Feeling down, depressed, or hopeless? Not at all   PHQ-2 Total Score 0   5 min spent completing and discussing screen     Health Habits and Functional and Cognitive Screenin/2/2024     8:00 AM   Functional & Cognitive Status   Do you have difficulty preparing food and eating? No   Do you have difficulty bathing yourself, getting dressed or grooming yourself? No   Do you have difficulty using the toilet? No   Do you have difficulty moving around from place to place? No   Do you have trouble with steps or getting out of a bed or a chair? No   Current Diet Well Balanced Diet   Dental Exam Up to date   Eye Exam Up to date   Exercise (times per week) 7 times per week   Current Exercises Include Walking   Do you need help using the phone?  No   Are you deaf or do you have serious difficulty hearing?  No   Do you need help to go to places out of walking distance? No   Do you need help shopping? No   Do you need help preparing meals?  No    Do you need help with housework?  No   Do you need help with laundry? No   Do you need help taking your medications? No   Do you need help managing money? No   Do you ever drive or ride in a car without wearing a seat belt? No   Have you felt unusual stress, anger or loneliness in the last month? No   Who do you live with? Alone   If you need help, do you have trouble finding someone available to you? No   Have you been bothered in the last four weeks by sexual problems? No   Do you have difficulty concentrating, remembering or making decisions? No           Age-appropriate Screening Schedule:  Refer to the list below for future screening recommendations based on patient's age, sex and/or medical conditions. Orders for these recommended tests are listed in the plan section. The patient has been provided with a written plan.    Health Maintenance List  Health Maintenance   Topic Date Due    ZOSTER VACCINE (2 of 3) 02/25/2013    TDAP/TD VACCINES (2 - Td or Tdap) 12/24/2022    COVID-19 Vaccine (3 - 2024-25 season) 09/01/2024    DXA SCAN  09/16/2024    ANNUAL WELLNESS VISIT  12/05/2024    LIPID PANEL  11/21/2025    BMI FOLLOWUP  12/02/2025    MAMMOGRAM  11/21/2026    COLORECTAL CANCER SCREENING  01/02/2027    HEPATITIS C SCREENING  Completed    INFLUENZA VACCINE  Completed    Pneumococcal Vaccine 65+  Completed                                                                                                                                                CMS Preventative Services Quick Reference  Risk Factors Identified During Encounter  Cardiovascular risk, and obesity, her risk factors are modified.     The above risks/problems have been discussed with the patient.  Pertinent information has been shared with the patient in the After Visit Summary.  An After Visit Summary and PPPS were made available to the patient.    Follow Up:   Next Medicare Wellness visit to be scheduled in 1 year.         Additional E&M Note  during same encounter follows:  Patient has additional, significant, and separately identifiable condition(s)/problem(s) that require work above and beyond the Medicare Wellness Visit     Chief Complaint  Medicare Wellness-subsequent, Prediabetes, Hyperlipidemia, and Anxiety    Subjective   Blood Sugar Problem  Purpose of visit: Patient is here today to follow up on Pre-Diabetes.   Symptoms are chronic.   Onset was 1 to 5 years.   Symptoms have been unchanged since onset.   Pertinent negative symptoms include no abdominal pain, no joint pain, no change in stool, no chest pain, no fatigue, no fever, no headaches, no joint swelling, no myalgias, no nausea, no neck pain, no numbness, no rash, no dysuria, no visual change and no weakness.   Aggravating factors include drinking and eating.   Treatments tried include nothing.   Improvement on treatment was mild.   Hyperlipidemia  This is a chronic problem. The current episode started more than 1 year ago. The problem is controlled. Exacerbating diseases include diabetes and obesity. She has no history of chronic renal disease, hypothyroidism, liver disease or nephrotic syndrome. Factors aggravating her hyperlipidemia include fatty foods. Pertinent negatives include no chest pain, focal sensory loss, focal weakness, leg pain, myalgias or shortness of breath. Current antihyperlipidemic treatment includes statins. The current treatment provides mild improvement of lipids. There are no compliance problems.  Risk factors for coronary artery disease include dyslipidemia, post-menopausal and obesity.   Anxiety  Presents for follow-up visit.  Symptoms include insomnia and palpitations.  Patient reports no chest pain, compulsions, confusion, decreased concentration, depressed mood, dizziness, excessive worry, irritability, nausea, nervous/anxious behavior, restlessness, shortness of breath or suicidal ideas. Muscle tension: 8.Symptoms occur occasionally. The patient sleeps 8 hours  "per night. The quality of sleep is fair. Awakens seldom during the night. Treatments tried: Alprazolam. The treatment provided moderate relief. Compliance with medications is %.     Camille is also being seen today for additional medical problem/s.    Review of Systems   Constitutional:  Negative for activity change, appetite change, fatigue, fever and irritability.   Eyes:  Negative for visual disturbance.   Respiratory:  Negative for shortness of breath and wheezing.    Cardiovascular:  Positive for palpitations. Negative for chest pain and leg swelling.   Gastrointestinal:  Negative for abdominal pain, constipation, diarrhea and nausea.   Endocrine: Negative for cold intolerance, heat intolerance, polydipsia and polyuria.   Genitourinary:  Negative for dysuria, frequency and urgency.   Musculoskeletal:  Positive for arthralgias (knees multiple). Negative for joint pain, joint swelling, myalgias and neck pain.   Skin:  Negative for rash.   Neurological:  Negative for dizziness, focal weakness, syncope, weakness, numbness and confusion.   Hematological:  Negative for adenopathy. Does not bruise/bleed easily.   Psychiatric/Behavioral:  Negative for decreased concentration, suicidal ideas and depressed mood. The patient has insomnia. The patient is not nervous/anxious.               Objective   Vital Signs:  /84 (BP Location: Right arm, Patient Position: Sitting, Cuff Size: Large Adult)   Pulse 84   Temp 97.5 °F (36.4 °C) (Temporal)   Resp 18   Ht 154.9 cm (61\")   Wt 90.4 kg (199 lb 3.2 oz)   SpO2 95% Comment: room air  BMI 37.64 kg/m²   Physical Exam  Constitutional:       General: She is not in acute distress.     Appearance: She is well-developed.   HENT:      Head: Normocephalic. Hair is normal.      Right Ear: Tympanic membrane and external ear normal.      Left Ear: Tympanic membrane and external ear normal.      Nose: Nose normal. No mucosal edema.      Mouth/Throat:      Pharynx: Uvula " midline.   Eyes:      General:         Right eye: No discharge.         Left eye: No discharge.      Conjunctiva/sclera: Conjunctivae normal.      Pupils: Pupils are equal, round, and reactive to light.   Neck:      Thyroid: No thyromegaly.      Vascular: No JVD.   Cardiovascular:      Rate and Rhythm: Normal rate and regular rhythm.      Chest Wall: PMI is not displaced.      Pulses:           Carotid pulses are 2+ on the right side and 2+ on the left side.       Femoral pulses are 2+ on the right side and 2+ on the left side.       Dorsalis pedis pulses are 2+ on the right side and 2+ on the left side.      Heart sounds: Normal heart sounds. No murmur heard.     No friction rub. No gallop.      Comments: Negative Homans' no edema  Pulmonary:      Effort: Pulmonary effort is normal. No respiratory distress.      Breath sounds: Normal breath sounds. No decreased breath sounds, wheezing, rhonchi or rales.   Chest:   Breasts:     Breasts are symmetrical.      Right: No inverted nipple, mass, nipple discharge, skin change or tenderness.      Left: No inverted nipple, mass, nipple discharge, skin change or tenderness.   Abdominal:      General: Bowel sounds are normal. There is no distension or abdominal bruit.      Palpations: Abdomen is soft. There is no mass.      Tenderness: There is no abdominal tenderness.   Musculoskeletal:         General: No tenderness. Deformity: knees and distal fingers..     Cervical back: Normal range of motion and neck supple. No muscular tenderness.   Lymphadenopathy:      Cervical: No cervical adenopathy.      Upper Body:      Right upper body: No supraclavicular adenopathy.      Left upper body: No supraclavicular adenopathy.   Skin:     General: Skin is warm and dry.      Findings: No ecchymosis, erythema, lesion or rash.   Neurological:      Mental Status: She is alert and oriented to person, place, and time.      Cranial Nerves: No cranial nerve deficit.      Sensory: No sensory  deficit.      Motor: No abnormal muscle tone.      Coordination: Coordination normal.      Deep Tendon Reflexes: Reflexes are normal and symmetric. Reflexes normal.   Psychiatric:         Speech: Speech normal.         Behavior: Behavior normal.         Thought Content: Thought content normal. Thought content does not include suicidal ideation.         Cognition and Memory: She does not exhibit impaired recent memory or impaired remote memory.         Judgment: Judgment normal. Judgment is not impulsive.         The following data was reviewed by: Dalila Garcia MD on 12/02/2024:        Medicare annual wellness visit, subsequent    Prediabetes    Hyperglycemia    Palpitations    Paroxysmal supraventricular tachycardia    Elevated coronary artery calcium score    Mixed hyperlipidemia   Lipid abnormalities are stable    Plan:  Continue same medication/s without change.      Discussed medication dosage, use, side effects, and goals of treatment in detail.    Counseled patient on lifestyle modifications to help control hyperlipidemia.     Patient Treatment Goals:   LDL goal is less than 70    Followup in 3 months.  Anxiety    Osteopenia of multiple sites    Primary osteoarthritis of both knees    Seasonal allergic rhinitis due to pollen    Gastroesophageal reflux disease without esophagitis    Flu vaccine need    Class 2 obesity due to excess calories without serious comorbidity with body mass index (BMI) of 38.0 to 38.9 in adult  Patient's (Body mass index is 37.64 kg/m².) indicates that they are obese (BMI >30) with health conditions that include hypertension, diabetes mellitus, and dyslipidemias . Weight is improving with lifestyle modifications. BMI  is above average; BMI management plan is completed. We discussed portion control and increasing exercise.   Snoring    Colon cancer screening    Encounter for screening mammogram for malignant neoplasm of breast    History of cardiovascular stress test    Cervical  cancer screening      Orders Placed This Encounter   Procedures    DEXA Bone Density Axial     Order Specific Question:   Is patient taking or have taken long term Glucocorticoid (steroids)?     Answer:   No     Order Specific Question:   Does the patient have rheumatoid arthritis?     Answer:   No     Order Specific Question:   Does the patient have secondary osteoporosis?     Answer:   No     Order Specific Question:   Reason for Exam:     Answer:   Post Menopausal state     Order Specific Question:   Release to patient     Answer:   Routine Release [[id number]]    Fluzone High-Dose 65+yrs ([id number])    ECG 12 Lead     This order was created via procedure documentation     Order Specific Question:   Release to patient     Answer:   Routine Release [[id number]]     New Medications Ordered This Visit   Medications    ALPRAZolam (XANAX) 0.5 MG tablet     Sig: Take 1 tablet by mouth 2 (Two) Times a Day As Needed for Anxiety. Use sparingly.     Dispense:  30 tablet     Refill:  0     Assessment and Plan CC Problem List  Visit Diagnosis  ROS  Review (Popup)  Health Maintenance  Quality  BestPractice  Medications  SmartSets  SnapShot Encounters  Media  Problem List Items Addressed This Visit          High    Paroxysmal supraventricular tachycardia    Overview     Exacerbation, off meds. She declines meds at present watch caffeine and follow for present. Neg exam, neg ECG. She has been on beta blockers in the past.          Prediabetes    Overview     A1c 6.1, 12/02/2024, improve diet and lots of exercise.   A1c 5.8 12/5/2023  Begin Ozempic and follow            Medium    Mixed hyperlipidemia    Overview     Compliant with meds.   Stable         Current Assessment & Plan      Lipid abnormalities are stable    Plan:  Continue same medication/s without change.      Discussed medication dosage, use, side effects, and goals of treatment in detail.    Counseled patient on lifestyle modifications to help control  hyperlipidemia.     Patient Treatment Goals:   LDL goal is less than 70    Followup in 3 months.         History of cardiovascular stress test    Overview     Negative 2021         Elevated coronary artery calcium score    Overview     314 2024 no sxs risk factors modified, consider repeat GXT  151 2021 neg GXT 2021  156 2023            Low    Gastroesophageal reflux disease without esophagitis    Overview     stable on meds which are continued. Pros cons and potential side effects discussed.          Primary osteoarthritis of both knees    Overview     Present stable with minimal sxs and prn use of topical and po anti inflammatories as sparingly as possible.          Seasonal allergic rhinitis due to pollen    Overview     Medication(s) continued usage and side effects discussed.   epi-pen. For severe rxn.  No recent usage            Unprioritized    Osteopenia of multiple sites    Overview     -1.8 spine, -2.3 FN, -1.6 hip, 2022 continue Calcium and wt bearing exerise   -2.0 spine ,-0.5 FN 08/04/2020  -2.3 spine, -1.5 FN, -1.3 hip, 2018   of results -2.4 spine, -1.6 FN, -1.4 hip, 2013  -1.3 spine, -1.3 FN, -0.1 hip, 2011.  -0.8 spine, -0.7 FN, -0.1 hip, 2007  Calcium rich diet and exercise encouraged.  Calcium 1200 mg with vit d daily         Relevant Orders    DEXA Bone Density Axial    Anxiety    Overview     controlled with very rare prn meds which are continued.          Relevant Medications    ALPRAZolam (XANAX) 0.5 MG tablet    Class 2 obesity due to excess calories without serious comorbidity with body mass index (BMI) of 38.0 to 38.9 in adult    Overview     Diet exercise and wt loss discussed. Techniques discussed         Current Assessment & Plan     Patient's (Body mass index is 37.64 kg/m².) indicates that they are obese (BMI >30) with health conditions that include hypertension, diabetes mellitus, and dyslipidemias . Weight is improving with lifestyle modifications. BMI  is above average; BMI  management plan is completed. We discussed portion control and increasing exercise.          Medicare annual wellness visit, subsequent - Primary    Overview     Healthy diet and regular exercise. Breast self exams seat belts sunscreens, general safety and fall prevention discussed and encoruaged. Lab discussed in detail.         Relevant Orders    ECG 12 Lead    Cervical cancer screening    Overview     Last pap smear 11/2/2015 negative. HPV negative         Encounter for screening mammogram for malignant neoplasm of breast    Overview     Last mammogram 09/16/22, entirely fatty         Colon cancer screening    Overview     Last cologuard 01/02/2024-negative, repeat 3 yrs  Last colonoscopy 01/14/2013 Repeat 2023.          Other Visit Diagnoses       Hyperglycemia        Palpitations        neg ECG holter and further cardiac workup if sxs don't improve. offered today declined, I think that is reasonable.    Flu vaccine need        Relevant Orders    Fluzone High-Dose 65+yrs ([id number]) (Completed)    Snoring        Consider sleep study            Follow Up   Return in about 3 months (around 3/2/2025).  Patient was given instructions and counseling regarding her condition or for health maintenance advice. Please see specific information pulled into the AVS if appropriate.    Advanced Care Planning:  ACP discussion was held with the patient during this visit. Patient has an advance directive (not in EMR), copy requested.    A face-to-face visit was completed today with patient.  Counseling explanation, and discussion of advanced directives was performed.   The last advanced care visit was performed in 2023.  In a near life ending situation, from which she is not expected to recover functionally, and she is not able to speak for her, she does not want life sustaining measures. We discussed feeding tubes, ventilators and cardiac support as well as dialysis.    I spent more than 16 minutes discussing Advanced Care  Planning information and documenting in the chart.

## 2024-12-02 ENCOUNTER — OFFICE VISIT (OUTPATIENT)
Dept: FAMILY MEDICINE CLINIC | Facility: CLINIC | Age: 69
End: 2024-12-02
Payer: MEDICARE

## 2024-12-02 VITALS
BODY MASS INDEX: 37.61 KG/M2 | WEIGHT: 199.2 LBS | DIASTOLIC BLOOD PRESSURE: 84 MMHG | HEIGHT: 61 IN | HEART RATE: 84 BPM | RESPIRATION RATE: 18 BRPM | OXYGEN SATURATION: 95 % | SYSTOLIC BLOOD PRESSURE: 120 MMHG | TEMPERATURE: 97.5 F

## 2024-12-02 DIAGNOSIS — Z92.89 HISTORY OF CARDIOVASCULAR STRESS TEST: ICD-10-CM

## 2024-12-02 DIAGNOSIS — R06.83 SNORING: ICD-10-CM

## 2024-12-02 DIAGNOSIS — E66.812 CLASS 2 OBESITY DUE TO EXCESS CALORIES WITHOUT SERIOUS COMORBIDITY WITH BODY MASS INDEX (BMI) OF 38.0 TO 38.9 IN ADULT: ICD-10-CM

## 2024-12-02 DIAGNOSIS — R73.03 PREDIABETES: ICD-10-CM

## 2024-12-02 DIAGNOSIS — K21.9 GASTROESOPHAGEAL REFLUX DISEASE WITHOUT ESOPHAGITIS: ICD-10-CM

## 2024-12-02 DIAGNOSIS — Z12.11 COLON CANCER SCREENING: ICD-10-CM

## 2024-12-02 DIAGNOSIS — Z00.00 MEDICARE ANNUAL WELLNESS VISIT, SUBSEQUENT: Primary | ICD-10-CM

## 2024-12-02 DIAGNOSIS — M17.0 PRIMARY OSTEOARTHRITIS OF BOTH KNEES: ICD-10-CM

## 2024-12-02 DIAGNOSIS — E66.09 CLASS 2 OBESITY DUE TO EXCESS CALORIES WITHOUT SERIOUS COMORBIDITY WITH BODY MASS INDEX (BMI) OF 38.0 TO 38.9 IN ADULT: ICD-10-CM

## 2024-12-02 DIAGNOSIS — J30.1 SEASONAL ALLERGIC RHINITIS DUE TO POLLEN: ICD-10-CM

## 2024-12-02 DIAGNOSIS — I47.10 PAROXYSMAL SUPRAVENTRICULAR TACHYCARDIA: ICD-10-CM

## 2024-12-02 DIAGNOSIS — Z23 FLU VACCINE NEED: ICD-10-CM

## 2024-12-02 DIAGNOSIS — R00.2 PALPITATIONS: ICD-10-CM

## 2024-12-02 DIAGNOSIS — Z12.4 CERVICAL CANCER SCREENING: ICD-10-CM

## 2024-12-02 DIAGNOSIS — M85.89 OSTEOPENIA OF MULTIPLE SITES: ICD-10-CM

## 2024-12-02 DIAGNOSIS — E78.2 MIXED HYPERLIPIDEMIA: ICD-10-CM

## 2024-12-02 DIAGNOSIS — R73.9 HYPERGLYCEMIA: ICD-10-CM

## 2024-12-02 DIAGNOSIS — R93.1 ELEVATED CORONARY ARTERY CALCIUM SCORE: ICD-10-CM

## 2024-12-02 DIAGNOSIS — Z12.31 ENCOUNTER FOR SCREENING MAMMOGRAM FOR MALIGNANT NEOPLASM OF BREAST: ICD-10-CM

## 2024-12-02 DIAGNOSIS — F41.9 ANXIETY: ICD-10-CM

## 2024-12-02 RX ORDER — ALPRAZOLAM 0.5 MG
0.5 TABLET ORAL 2 TIMES DAILY PRN
Qty: 30 TABLET | Refills: 0 | Status: SHIPPED | OUTPATIENT
Start: 2024-12-02

## 2024-12-02 NOTE — ASSESSMENT & PLAN NOTE
Patient's (Body mass index is 37.64 kg/m².) indicates that they are obese (BMI >30) with health conditions that include hypertension, diabetes mellitus, and dyslipidemias . Weight is improving with lifestyle modifications. BMI  is above average; BMI management plan is completed. We discussed portion control and increasing exercise.

## 2024-12-02 NOTE — ASSESSMENT & PLAN NOTE
Lipid abnormalities are stable    Plan:  Continue same medication/s without change.      Discussed medication dosage, use, side effects, and goals of treatment in detail.    Counseled patient on lifestyle modifications to help control hyperlipidemia.     Patient Treatment Goals:   LDL goal is less than 70    Followup in 3 months.   Refill approved as requested.

## 2024-12-10 ENCOUNTER — HOSPITAL ENCOUNTER (OUTPATIENT)
Dept: BONE DENSITY | Facility: HOSPITAL | Age: 69
Discharge: HOME | End: 2024-12-10
Admitting: FAMILY MEDICINE
Payer: MEDICARE

## 2024-12-10 PROCEDURE — 77080 DXA BONE DENSITY AXIAL: CPT

## 2024-12-12 DIAGNOSIS — E78.2 MIXED HYPERLIPIDEMIA: ICD-10-CM

## 2024-12-13 RX ORDER — ATORVASTATIN CALCIUM 40 MG/1
40 TABLET, FILM COATED ORAL DAILY
Qty: 90 TABLET | Refills: 3 | Status: SHIPPED | OUTPATIENT
Start: 2024-12-13

## 2025-03-07 NOTE — PROGRESS NOTES
Chief Complaint  Establish Care, Hyperlipidemia, Heartburn, Anxiety, and Urinary Tract Infection    History of Present Illness  Camille Mendoza presents today to [facility] care and for follow up on hyperlipidemia, GERD, and anxiety and new onset concern of UTI    Patient's previous PCP was Dr. Garcia and her last medicare wellness was on 12/2/24.    Hyperlipidemia  Patient is not following a low cholesterol diet.   Currently is on statin therapy.  Patient reports is exercising.  Patient reports they are taking medications as prescribed and they are not having side effects.    GERD  Paitent complains of heartburn. This has been associated with no other symptoms.    Symptoms have been present for several years. She denies dysphagia.  She has not lost weight. She denies melena, hematochezia, hematemesis, and coffee ground emesis.   Medical therapy in the past has included proton pump inhibitors.  Patient reports they are taking medications as prescribed and they are not having side effects.    Anxiety/Depression  Associated symptoms include: anxiety   Severity is described per patient : Mild  Patient reports they are taking medications as prescribed and they are not having side effects.    UTI  Patient complains of burning with urination, frequency, hematuria, inability to void, and urgency. She has had symptoms for 1 day.    History of Present Illness  The patient is a 70-year-old female who presents to [facility]. Her past medical history includes hyperlipidemia, reflux, anxiety, prediabetes, elevated coronary artery calcium score, paroxysmal supraventricular tachycardia (SVT), seasonal allergies, and arthritis of her knees. Today, she is concerned about a possible urinary tract infection (UTI).    She reports experiencing urinary urgency since yesterday, with minimal urine output and associated dysuria. She describes an unusual sensation of pins and needles in her upper body during urination, particularly in her  hands. She has observed hematuria on one occasion. She has no history of nephrolithiasis but admits to occasional renal discomfort. She maintains adequate hydration, primarily consuming water and limiting soft drinks. She has no history of recurrent UTIs. Despite being nulliparous, she suspects some degree of bladder prolapse due to her weight and age. She consistently practices proper perineal hygiene and is not sexually active. She has not needed antibiotics often and expresses a preference to avoid Bactrim due to her  not previously tolerating it well. She reports urinary incontinence, necessitating the use of two pads overnight. She does not perform Kegel exercises. She initially experienced pruritus in the vaginal area, followed by dysuria. She reports no vaginal discharge but notes persistent moisture in the area. She occasionally resorts to manual pressure to alleviate the itching.    She has undergone [employer] screening for two consecutive years, revealing an elevated cardiac calcium score. She underwent a stress test in 2021, which was reported as low risk. She has not consulted a cardiologist. She reports no angina but experiences occasional palpitations, which she attributes to caffeine and alcohol consumption. She has a history of premature ventricular contractions (PVCs) and paroxysmal SVT. She reports no pedal edema but notes fat ankles. She is currently on low-dose aspirin therapy.    She has a mole on her nose and requests a skin screening due to a history of sun exposure from swimming. She has a dark spot on her nose and numerous bumps on her face on her chest. She reports a long-standing lesion that intermittently itches, burns, or causes pain. She also has a small blackhead near her eye and clusters of itchy spots, which she believes are calcium deposits. She has had these spots for several years and notes that her sister had similar lesions.    She has received the initial doses of the  COVID-19 vaccine but has not had recent annual boosters. She receives annual influenza vaccines.  In the past she has contracted COVID-19 twice, with mild symptoms including sneezing and rhinorrhea. She did not undergo testing during these episodes. She has previously lost 50 pounds through dietary modifications but found the diet challenging and currently due developing to a meat allergy she does not think she could follow the same diet. She is currently attempting to reduce sugar intake and consume more berries.    Supplemental Information  She has a history of acid reflux, which is triggered by alcohol consumption on an empty stomach.    SOCIAL HISTORY  She does not smoke cigarettes. She drinks alcohol a couple of times a week.    FAMILY HISTORY  Her brother had a dissected aorta and high blood pressure. Her father had diabetes and  from complications related to gallbladder attack and pancreatitis. Her paternal aunt developed diabetes in her late 80s. Her maternal great-grandmother had breast cancer.    ALLERGIES  The patient has no known allergies.    MEDICATIONS  Aspirin.    IMMUNIZATIONS  She has received the first couple of doses of the COVID-19 vaccine. She takes the influenza vaccine annually.      Patient Care Team:  Regina Murrieta MD as PCP - General (Family Medicine)   Current Outpatient Medications on File Prior to Visit   Medication Sig    ALPRAZolam (XANAX) 0.5 MG tablet Take 1 tablet by mouth 2 (Two) Times a Day As Needed for Anxiety. Use sparingly.    aspirin 81 MG EC tablet Take 1 tablet by mouth Daily.    atorvastatin (LIPITOR) 40 MG tablet TAKE 1 TABLET BY MOUTH DAILY    diphenhydrAMINE (BENADRYL) 25 MG tablet Take 1 tablet by mouth Every 6 (Six) Hours As Needed for Itching.    Melatonin 10 MG capsule Take 1 capsule by mouth Every Night.    omeprazole (priLOSEC) 20 MG capsule Take 1 capsule by mouth Daily.     No current facility-administered medications on file prior to visit.  "      Objective   Vital Signs:   /80 (BP Location: Right arm, Patient Position: Sitting, Cuff Size: Large Adult)   Pulse 70   Temp 97.5 °F (36.4 °C) (Temporal)   Resp 18   Ht 158.8 cm (62.5\")   Wt 90.5 kg (199 lb 9.6 oz)   SpO2 96%   BMI 35.93 kg/m²    BP Readings from Last 3 Encounters:   03/10/25 120/80   12/02/24 120/84   12/05/23 120/62     Wt Readings from Last 3 Encounters:   03/10/25 90.5 kg (199 lb 9.6 oz)   12/02/24 90.4 kg (199 lb 3.2 oz)   10/03/24 92.1 kg (203 lb)         Physical Exam  Vitals and nursing note reviewed.   Constitutional:       General: She is not in acute distress.     Appearance: Normal appearance. She is well-developed. She is obese. She is not ill-appearing.   HENT:      Head: Normocephalic and atraumatic.   Cardiovascular:      Rate and Rhythm: Normal rate and regular rhythm.      Heart sounds: No murmur heard.  Pulmonary:      Effort: Pulmonary effort is normal.      Breath sounds: Normal breath sounds. No wheezing.   Musculoskeletal:         General: Normal range of motion.   Skin:     General: Skin is warm and dry.      Findings: No rash.      Comments: Multiple larger white papules on her cheeks and forehead cluster of 4-5 superficial nodules on her left nares darker lesion right cheek, small red flaky lesion right anterior chest   Neurological:      Mental Status: She is alert and oriented to person, place, and time.   Psychiatric:         Mood and Affect: Mood normal.         Behavior: Behavior normal.         Thought Content: Thought content normal.          Physical Exam        Office Visit on 03/10/2025   Component Date Value Ref Range Status    Color 03/10/2025 Yellow  Yellow, Straw, Dark Yellow, Tiffany Final    Clarity, UA 03/10/2025 Clear  Clear Final    Glucose, UA 03/10/2025 Negative  Negative mg/dL Final    Bilirubin 03/10/2025 Negative  Negative Final    Ketones, UA 03/10/2025 Negative  Negative Final    Specific Gravity  03/10/2025 1.005  1.005 - 1.030 " Final    Blood, UA 03/10/2025 Large (A)  Negative Final    pH, Urine 03/10/2025 5.0  5.0 - 8.0 Final    Protein, POC 03/10/2025 Trace (A)  Negative mg/dL Final    Urobilinogen, UA 03/10/2025 Normal  Normal, 0.2 E.U./dL Final    Leukocytes 03/10/2025 Small (1+) (A)  Negative Final    Nitrite, UA 03/10/2025 Positive (A)  Negative Final    Hemoglobin A1C 03/10/2025 5.5  4.5 - 5.7 % Final    Lot Number 03/10/2025 #[id number]   Final    Expiration Date 03/10/2025 10/31/2026   Final     A1C Last 3 Results          11/21/2024    10:19 3/10/2025    11:43   HGBA1C Last 3 Results   Hemoglobin A1C 6.1  5.5      Lab Results   Component Value Date    CHOL 202 (H) 12/28/2018    CHLPL 177 11/21/2024    TRIG 154 (H) 11/21/2024    HDL 71 11/21/2024    LDL 80 11/21/2024     Lab Results   Component Value Date    TSH 2.630 11/21/2024     Lab Results   Component Value Date    GLUCOSE 103 (H) 11/21/2024    BUN 11 11/21/2024    CREATININE 0.80 11/21/2024    EGFRIFNONA 70 10/14/2021    EGFRIFAFRI 80 10/14/2021    BCR 14 11/21/2024    K 4.3 11/21/2024    CO2 23 11/21/2024    CALCIUM 9.9 11/21/2024    ALBUMIN 4.7 11/21/2024    AST 15 11/21/2024    ALT 15 11/21/2024     Lab Results   Component Value Date    WBC 7.1 11/21/2024    HGB 14.2 11/21/2024    HCT 43.3 11/21/2024    MCV 94 11/21/2024     11/21/2024             Results  Laboratory Studies  Urinalysis shows large blood, small leukocytes, and positive for nitrites consistent with urinary tract infection. A1c is 5.5. LDL was 80, total cholesterol was 177. Glucose was 103.    Imaging  Cardiac CT scan December 2024 showed elevation in calcium score in the LAD to 314 from previous 121 in December 2023 , a moderate plaque burden.             Assessment and Plan    Diagnoses and all orders for this visit:    1. Encounter to establish care (Primary)    2. Mixed hyperlipidemia    3. Gastroesophageal reflux disease without esophagitis    4. Anxiety    5. Burning with urination  -     POCT  urinalysis dipstick, manual  -     Urine Culture - Urine, Urine, Clean Catch    6. Urinary frequency  -     POCT urinalysis dipstick, manual  -     Urine Culture - Urine, Urine, Clean Catch    7. Hematuria, unspecified type  -     POCT urinalysis dipstick, manual  -     Urine Culture - Urine, Urine, Clean Catch    8. Unable to void  -     POCT urinalysis dipstick, manual  -     Urine Culture - Urine, Urine, Clean Catch    9. Class 2 obesity with body mass index (BMI) of 35.0 to 35.9 in adult, unspecified obesity type, unspecified whether serious comorbidity present    10. Acute cystitis with hematuria  -     nitrofurantoin, macrocrystal-monohydrate, (Macrobid) 100 MG capsule; Take 1 capsule by mouth 2 (Two) Times a Day.  Dispense: 14 capsule; Refill: 0    11. Palpitations  -     Ambulatory Referral to Cardiology    12. Elevated coronary artery calcium score  -     Ambulatory Referral to Cardiology    13. Paroxysmal supraventricular tachycardia  -     Ambulatory Referral to Cardiology    14. Family history of early CAD  -     Ambulatory Referral to Cardiology    15. Grouped skin lesions  -     Ambulatory Referral to Dermatology    16. AK (actinic keratosis)  -     Ambulatory Referral to Dermatology    17. Prediabetes  -     POC Glycosylated Hemoglobin (Hb A1C)        Assessment & Plan  1. Urinary tract infection (UTI).  Symptoms include urinary urgency, burning, and discomfort in the upper body. Urinalysis shows large blood, small leukocytes, and positive nitrites, consistent with a UTI. A prescription for Macrobid 100 mg, to be taken twice daily for one week, has been issued. A urine culture will be sent for further analysis. She is advised to maintain hydration and apply A and D ointment or Desitin to the affected area to protect the skin from moisture. If symptoms persist beyond three days, the culture results will guide further treatment.    2. Prediabetes.  Her A1c level has improved from 5.8 to 5.5 over the  past year, indicating a return to normal range. She is advised to continue monitoring her blood sugar levels and maintain a healthy diet rich in berries, vegetables, lean meats, and whole grains.    3. Hyperlipidemia.  Her cholesterol levels are well-managed with her current medication. She is advised to continue atorvastatin 40 mg daily    4. Elevated coronary artery calcium score.  Her calcium score has increased from 121 to 314, indicating a moderate plaque burden in the left anterior descending artery. A referral to Dr. Sahu for a cardiology consultation has been made. She is advised to follow up with cardiology to determine if further testing, such as a stress test, is needed.    5. Actinic keratosis.  She has a rough, red lesion on her nose that could be actinic keratosis, a precancerous condition. A referral to dermatology has been made for further evaluation and treatment.    6. Health maintenance.  She is advised to receive the COVID-19 vaccine around the same time as her influenza vaccine. If she contracts COVID-19, Paxlovid can be considered to reduce the severity of the infection.    Follow-up  The patient will follow up in December for her Medicare wellness visit or as needed.      There are no discontinued medications.    I spent 63 minutes caring for Camille on this date of service. This time includes time spent by me in the following activities:preparing for the visit, reviewing tests, obtaining and/or reviewing a separately obtained history, performing a medically appropriate examination and/or evaluation , counseling and educating the patient/family/caregiver, ordering medications, tests, or procedures, referring and communicating with other health care professionals , documenting information in the medical record, and care coordination  Follow Up     Return in about 9 months (around 12/10/2025) for Medicare Wellness.    Patient was given instructions and counseling regarding her condition or for  health maintenance advice. Please see specific information pulled into the AVS if appropriate.     Patient or patient representative verbalized consent for the use of Ambient Listening during the visit with  Regina Murrieta MD for chart documentation. 3/10/2025  18:13 EDT

## 2025-03-10 ENCOUNTER — OFFICE VISIT (OUTPATIENT)
Dept: FAMILY MEDICINE CLINIC | Facility: CLINIC | Age: 70
End: 2025-03-10
Payer: MEDICARE

## 2025-03-10 VITALS
OXYGEN SATURATION: 96 % | WEIGHT: 199.6 LBS | BODY MASS INDEX: 35.37 KG/M2 | HEART RATE: 70 BPM | SYSTOLIC BLOOD PRESSURE: 120 MMHG | DIASTOLIC BLOOD PRESSURE: 80 MMHG | TEMPERATURE: 97.5 F | HEIGHT: 63 IN | RESPIRATION RATE: 18 BRPM

## 2025-03-10 DIAGNOSIS — K21.9 GASTROESOPHAGEAL REFLUX DISEASE WITHOUT ESOPHAGITIS: ICD-10-CM

## 2025-03-10 DIAGNOSIS — R00.2 PALPITATIONS: ICD-10-CM

## 2025-03-10 DIAGNOSIS — E66.812 CLASS 2 OBESITY WITH BODY MASS INDEX (BMI) OF 35.0 TO 35.9 IN ADULT, UNSPECIFIED OBESITY TYPE, UNSPECIFIED WHETHER SERIOUS COMORBIDITY PRESENT: ICD-10-CM

## 2025-03-10 DIAGNOSIS — I47.10 PAROXYSMAL SUPRAVENTRICULAR TACHYCARDIA: ICD-10-CM

## 2025-03-10 DIAGNOSIS — R35.0 URINARY FREQUENCY: ICD-10-CM

## 2025-03-10 DIAGNOSIS — R33.9 UNABLE TO VOID: ICD-10-CM

## 2025-03-10 DIAGNOSIS — L98.9 GROUPED SKIN LESIONS: ICD-10-CM

## 2025-03-10 DIAGNOSIS — R73.03 PREDIABETES: ICD-10-CM

## 2025-03-10 DIAGNOSIS — R30.0 BURNING WITH URINATION: ICD-10-CM

## 2025-03-10 DIAGNOSIS — E78.2 MIXED HYPERLIPIDEMIA: ICD-10-CM

## 2025-03-10 DIAGNOSIS — R93.1 ELEVATED CORONARY ARTERY CALCIUM SCORE: ICD-10-CM

## 2025-03-10 DIAGNOSIS — F41.9 ANXIETY: ICD-10-CM

## 2025-03-10 DIAGNOSIS — R31.9 HEMATURIA, UNSPECIFIED TYPE: ICD-10-CM

## 2025-03-10 DIAGNOSIS — L57.0 AK (ACTINIC KERATOSIS): ICD-10-CM

## 2025-03-10 DIAGNOSIS — N30.01 ACUTE CYSTITIS WITH HEMATURIA: ICD-10-CM

## 2025-03-10 DIAGNOSIS — Z82.49 FAMILY HISTORY OF EARLY CAD: ICD-10-CM

## 2025-03-10 DIAGNOSIS — Z76.89 ENCOUNTER TO ESTABLISH CARE: Primary | ICD-10-CM

## 2025-03-10 LAB
BILIRUB BLD-MCNC: NEGATIVE MG/DL
CLARITY, POC: CLEAR
COLOR UR: YELLOW
EXPIRATION DATE: NORMAL
GLUCOSE UR STRIP-MCNC: NEGATIVE MG/DL
HBA1C MFR BLD: 5.5 % (ref 4.5–5.7)
KETONES UR QL: NEGATIVE
LEUKOCYTE EST, POC: ABNORMAL
Lab: NORMAL
NITRITE UR-MCNC: POSITIVE MG/ML
PH UR: 5 [PH] (ref 5–8)
PROT UR STRIP-MCNC: ABNORMAL MG/DL
RBC # UR STRIP: ABNORMAL /UL
SP GR UR: 1 (ref 1–1.03)
UROBILINOGEN UR QL: NORMAL

## 2025-03-10 RX ORDER — NITROFURANTOIN 25; 75 MG/1; MG/1
100 CAPSULE ORAL 2 TIMES DAILY
Qty: 14 CAPSULE | Refills: 0 | Status: SHIPPED | OUTPATIENT
Start: 2025-03-10

## 2025-03-14 LAB
BACTERIA UR CULT: ABNORMAL
OTHER ANTIBIOTIC SUSC ISLT: ABNORMAL

## 2025-03-20 NOTE — PROGRESS NOTES
Date of Office Visit: 2025  Encounter Provider: Dr. Boris Sahu  Place of Service: [facility] CARDIOLOGY [place]  Patient Name: Camille Mendoza  :1955  Regina Murrieta MD    Chief Complaint   Patient presents with    Hyperlipidemia    Consult     History of Present Illness:    I am pleased to see Mrs. Mendoza in my office today as a new consultation.    As you know, patient is 70 years old white female whose past medical history is significant for hyperlipidemia, who is referred to me for for abnormal coronary CT calcification test.    In , patient underwent cardiac catheterization and no significant coronary artery disease was noted.  Patient reports that lately she is complaining of chest discomfort described as a pressure-like sensation when she walks uphill or upstairs.  On a flat ground she does not report any chest pain.  Patient does have shortness of breath.  No orthopnea and PND no syncope or presyncope.  No leg edema noted.    Patient does not have previous history of CAD, PCI or MI.  Patient does not smoke or abuse alcohol.  Patient has family history for coronary artery disease with father had multiple stents.    At this stage I would recommend that patient should proceed with cardiac catheterization.  However after discussion with the patient we agreed to proceed with CTA coronary artery.  I would start sublingual nitroglycerin.  Continue aspirin.  I would increase Lipitor to 80 mg daily.        Past Medical History:   Diagnosis Date    Anxiety     Gastroesophageal reflux disease without esophagitis     Menopausal syndrome     Mixed hyperlipidemia     Osteopenia of multiple sites     Paroxysmal supraventricular tachycardia     Primary osteoarthritis of both knees     Seasonal allergic rhinitis due to pollen          Past Surgical History:   Procedure Laterality Date    CARDIAC CATHETERIZATION  2007    Dr. Paris    CATARACT EXTRACTION, BILATERAL      Abel Simmons     DILATION AND CURETTAGE, DIAGNOSTIC / THERAPEUTIC  1974    KNEE ARTHROSCOPY Left 05/22/2019    [facility] with partial medial and lateral meniscectomy    TONSILLECTOMY AND ADENOIDECTOMY  1959           Current Outpatient Medications:     ALPRAZolam (XANAX) 0.5 MG tablet, Take 1 tablet by mouth 2 (Two) Times a Day As Needed for Anxiety. Use sparingly., Disp: 30 tablet, Rfl: 0    aspirin 81 MG EC tablet, Take 1 tablet by mouth Daily., Disp: , Rfl:     atorvastatin (LIPITOR) 80 MG tablet, Take 1 tablet by mouth Daily., Disp: 90 tablet, Rfl: 1    Melatonin 10 MG capsule, Take 1 capsule by mouth Every Night., Disp: , Rfl:     omeprazole (priLOSEC) 20 MG capsule, Take 1 capsule by mouth Daily., Disp: , Rfl:     metoprolol tartrate (LOPRESSOR) 50 MG tablet, Take 50 mg at Bedtime the Night Before Coronary CTA Appointment and In the Morning 1 Hour Prior to Coronary CTA Appointment. Do not take if heart rate less than 60., Disp: 2 tablet, Rfl: 0    nitroglycerin (NITROSTAT) 0.3 MG SL tablet, 1 under the tongue as needed for angina, may repeat q5mins for up three doses, Disp: 100 tablet, Rfl: 11      Social History     Socioeconomic History    Marital status:    Tobacco Use    Smoking status: Never     Passive exposure: Never    Smokeless tobacco: Never   Vaping Use    Vaping status: Never Used   Substance and Sexual Activity    Alcohol use: Yes     Alcohol/week: 4.0 standard drinks of alcohol     Types: 4 Drinks containing 0.5 oz of alcohol per week    Drug use: Yes     Types: Marijuana    Sexual activity: Defer         Review of Systems   Constitutional: Negative for chills and fever.   HENT:  Negative for ear discharge and nosebleeds.    Eyes:  Negative for discharge and redness.   Cardiovascular:  Positive for chest pain. Negative for orthopnea, palpitations, paroxysmal nocturnal dyspnea and syncope.   Respiratory:  Positive for shortness of breath. Negative for cough and wheezing.    Endocrine: Negative for  "heat intolerance.   Skin:  Negative for rash.   Musculoskeletal:  Negative for arthritis and myalgias.   Gastrointestinal:  Negative for abdominal pain, melena, nausea and vomiting.   Genitourinary:  Negative for dysuria and hematuria.   Neurological:  Negative for dizziness, light-headedness, numbness and tremors.   Psychiatric/Behavioral:  Negative for depression. The patient is not nervous/anxious.        Procedures    Procedures    No orders to display           Objective:    /86 (BP Location: Right arm, Patient Position: Sitting, Cuff Size: Large Adult)   Pulse 65   Resp 16   Ht 158 cm (62.21\")   Wt 90.7 kg (200 lb)   LMP 01/01/2003 (Approximate)   SpO2 97%   BMI 36.34 kg/m²         Constitutional:       Appearance: Well-developed.   Eyes:      General: No scleral icterus.        Right eye: No discharge.   HENT:      Head: Normocephalic and atraumatic.   Neck:      Thyroid: No thyromegaly.      Lymphadenopathy: No cervical adenopathy.   Pulmonary:      Effort: Pulmonary effort is normal. No respiratory distress.      Breath sounds: Normal breath sounds. No wheezing. No rales.   Cardiovascular:      Normal rate. Regular rhythm.      No gallop.    Edema:     Peripheral edema absent.   Abdominal:      Tenderness: There is no abdominal tenderness.   Skin:     Findings: No erythema or rash.   Neurological:      Mental Status: Alert and oriented to person, place, and time.             Assessment:       Diagnosis Plan   1. Mixed hyperlipidemia  CT Angiogram Coronary    CT Angiogram Coronary-Cardiology Interpretation    metoprolol tartrate (LOPRESSOR) tablet 200 mg    metoprolol tartrate (LOPRESSOR) tablet 150 mg    metoprolol tartrate (LOPRESSOR) tablet 100 mg    metoprolol tartrate (LOPRESSOR) tablet 50 mg    metoprolol tartrate (LOPRESSOR) tablet 50 mg    metoprolol tartrate (LOPRESSOR) injection 5 mg    nitroglycerin (NITROSTAT) SL tablet 0.4 mg    nitroglycerin (NITROSTAT) SL tablet 0.8 mg    No " Caffeine or Nicotine 4 Hours Prior to CTA Appointment    Nothing to Eat or Drink 4 Hours Prior to CTA Appointment    Do Not Take Phosphodiasterase Inhibitors in the 72 Hours Prior to Coronary CTA    Obtain Informed Consent - Computed Tomography Angiography of Chest - Angiogram of Coronary Arteries    Vital Signs Upon Arrival    Cardiac Monitoring    Verify NPO Status - Patient to Be NPO at Least 4 Hours Prior to CTA    Notify CT After Administration of metoprolol tartrate (LOPRESSOR) tablet    Notify Provider If Total Metoprolol Given Equals 300mg & Heart Rate Not At Goal    Notify Provider Prior to Administration of Nitroglycerin if Patient SBP <80    POC Creatinine    Insert Peripheral IV    Saline Lock & Maintain IV Access    sodium chloride 0.9 % flush 10 mL    sodium chloride 0.9 % flush 10 mL    sodium chloride 0.9 % infusion 40 mL    Vital Signs - See Instructions    Hold Medication Metformin (Glucophage, Glucophage XR, Fortament, Glumetza);  Metglip (metformin/glipizide);  Glucovance (metformin/glyburide); Avandamet (metformin/rosiglitazone)    Patient May Discharge Home After Procedure Complete (If Stable)    metoprolol tartrate (LOPRESSOR) 50 MG tablet    atorvastatin (LIPITOR) 80 MG tablet    nitroglycerin (NITROSTAT) 0.3 MG SL tablet      2. Angina pectoris  CT Angiogram Coronary    CT Angiogram Coronary-Cardiology Interpretation    metoprolol tartrate (LOPRESSOR) tablet 200 mg    metoprolol tartrate (LOPRESSOR) tablet 150 mg    metoprolol tartrate (LOPRESSOR) tablet 100 mg    metoprolol tartrate (LOPRESSOR) tablet 50 mg    metoprolol tartrate (LOPRESSOR) tablet 50 mg    metoprolol tartrate (LOPRESSOR) injection 5 mg    nitroglycerin (NITROSTAT) SL tablet 0.4 mg    nitroglycerin (NITROSTAT) SL tablet 0.8 mg    No Caffeine or Nicotine 4 Hours Prior to CTA Appointment    Nothing to Eat or Drink 4 Hours Prior to CTA Appointment    Do Not Take Phosphodiasterase Inhibitors in the 72 Hours Prior to Coronary  CTA    Obtain Informed Consent - Computed Tomography Angiography of Chest - Angiogram of Coronary Arteries    Vital Signs Upon Arrival    Cardiac Monitoring    Verify NPO Status - Patient to Be NPO at Least 4 Hours Prior to CTA    Notify CT After Administration of metoprolol tartrate (LOPRESSOR) tablet    Notify Provider If Total Metoprolol Given Equals 300mg & Heart Rate Not At Goal    Notify Provider Prior to Administration of Nitroglycerin if Patient SBP <80    POC Creatinine    Insert Peripheral IV    Saline Lock & Maintain IV Access    sodium chloride 0.9 % flush 10 mL    sodium chloride 0.9 % flush 10 mL    sodium chloride 0.9 % infusion 40 mL    Vital Signs - See Instructions    Hold Medication Metformin (Glucophage, Glucophage XR, Fortament, Glumetza);  Metglip (metformin/glipizide);  Glucovance (metformin/glyburide); Avandamet (metformin/rosiglitazone)    Patient May Discharge Home After Procedure Complete (If Stable)    metoprolol tartrate (LOPRESSOR) 50 MG tablet    nitroglycerin (NITROSTAT) 0.3 MG SL tablet      3. Coronary artery calcification  CT Angiogram Coronary    CT Angiogram Coronary-Cardiology Interpretation    metoprolol tartrate (LOPRESSOR) tablet 200 mg    metoprolol tartrate (LOPRESSOR) tablet 150 mg    metoprolol tartrate (LOPRESSOR) tablet 100 mg    metoprolol tartrate (LOPRESSOR) tablet 50 mg    metoprolol tartrate (LOPRESSOR) tablet 50 mg    metoprolol tartrate (LOPRESSOR) injection 5 mg    nitroglycerin (NITROSTAT) SL tablet 0.4 mg    nitroglycerin (NITROSTAT) SL tablet 0.8 mg    No Caffeine or Nicotine 4 Hours Prior to CTA Appointment    Nothing to Eat or Drink 4 Hours Prior to CTA Appointment    Do Not Take Phosphodiasterase Inhibitors in the 72 Hours Prior to Coronary CTA    Obtain Informed Consent - Computed Tomography Angiography of Chest - Angiogram of Coronary Arteries    Vital Signs Upon Arrival    Cardiac Monitoring    Verify NPO Status - Patient to Be NPO at Least 4 Hours  Prior to CTA    Notify CT After Administration of metoprolol tartrate (LOPRESSOR) tablet    Notify Provider If Total Metoprolol Given Equals 300mg & Heart Rate Not At Goal    Notify Provider Prior to Administration of Nitroglycerin if Patient SBP <80    POC Creatinine    Insert Peripheral IV    Saline Lock & Maintain IV Access    sodium chloride 0.9 % flush 10 mL    sodium chloride 0.9 % flush 10 mL    sodium chloride 0.9 % infusion 40 mL    Vital Signs - See Instructions    Hold Medication Metformin (Glucophage, Glucophage XR, Fortament, Glumetza);  Metglip (metformin/glipizide);  Glucovance (metformin/glyburide); Avandamet (metformin/rosiglitazone)    Patient May Discharge Home After Procedure Complete (If Stable)    metoprolol tartrate (LOPRESSOR) 50 MG tablet    nitroglycerin (NITROSTAT) 0.3 MG SL tablet               Plan:       MDM:    1.  Coronary calcification:    I would recommend to proceed with CTA.    3.  Angina pectoris:    Start nitroglycerin    3.  Mixed hyperlipidemia:    I would increase Lipitor to 80 mg daily    5.  Elevated blood pressure without diagnosis of hypertension:    Monitor the blood pressure at home and bring the logbook

## 2025-03-21 ENCOUNTER — OFFICE VISIT (OUTPATIENT)
Dept: CARDIOLOGY | Facility: CLINIC | Age: 70
End: 2025-03-21
Payer: MEDICARE

## 2025-03-21 VITALS
SYSTOLIC BLOOD PRESSURE: 156 MMHG | BODY MASS INDEX: 36.8 KG/M2 | DIASTOLIC BLOOD PRESSURE: 86 MMHG | WEIGHT: 200 LBS | RESPIRATION RATE: 16 BRPM | OXYGEN SATURATION: 97 % | HEIGHT: 62 IN | HEART RATE: 65 BPM

## 2025-03-21 DIAGNOSIS — I25.10 CORONARY ARTERY CALCIFICATION: ICD-10-CM

## 2025-03-21 DIAGNOSIS — I20.9 ANGINA PECTORIS: ICD-10-CM

## 2025-03-21 DIAGNOSIS — E78.2 MIXED HYPERLIPIDEMIA: Primary | ICD-10-CM

## 2025-03-21 RX ORDER — METOPROLOL TARTRATE 25 MG/1
200 TABLET, FILM COATED ORAL ONCE
OUTPATIENT
Start: 2025-03-21 | End: 2025-03-21

## 2025-03-21 RX ORDER — METOPROLOL TARTRATE 50 MG
50 TABLET ORAL
OUTPATIENT
Start: 2025-03-21

## 2025-03-21 RX ORDER — SODIUM CHLORIDE 0.9 % (FLUSH) 0.9 %
10 SYRINGE (ML) INJECTION AS NEEDED
OUTPATIENT
Start: 2025-03-21

## 2025-03-21 RX ORDER — METOPROLOL TARTRATE 25 MG/1
150 TABLET, FILM COATED ORAL ONCE
OUTPATIENT
Start: 2025-03-21

## 2025-03-21 RX ORDER — METOPROLOL TARTRATE 25 MG/1
100 TABLET, FILM COATED ORAL ONCE
OUTPATIENT
Start: 2025-03-21

## 2025-03-21 RX ORDER — NITROGLYCERIN 0.4 MG/1
0.8 TABLET SUBLINGUAL
OUTPATIENT
Start: 2025-03-21

## 2025-03-21 RX ORDER — ATORVASTATIN CALCIUM 80 MG/1
80 TABLET, FILM COATED ORAL DAILY
Qty: 90 TABLET | Refills: 1 | Status: SHIPPED | OUTPATIENT
Start: 2025-03-21

## 2025-03-21 RX ORDER — NITROGLYCERIN 0.4 MG/1
0.4 TABLET SUBLINGUAL
OUTPATIENT
Start: 2025-03-21 | End: 2025-03-21

## 2025-03-21 RX ORDER — METOPROLOL TARTRATE 1 MG/ML
5 INJECTION, SOLUTION INTRAVENOUS
OUTPATIENT
Start: 2025-03-21

## 2025-03-21 RX ORDER — NITROGLYCERIN 0.3 MG/1
TABLET SUBLINGUAL
Qty: 100 TABLET | Refills: 11 | Status: SHIPPED | OUTPATIENT
Start: 2025-03-21

## 2025-03-21 RX ORDER — SODIUM CHLORIDE 0.9 % (FLUSH) 0.9 %
10 SYRINGE (ML) INJECTION EVERY 12 HOURS SCHEDULED
OUTPATIENT
Start: 2025-03-21

## 2025-03-21 RX ORDER — METOPROLOL TARTRATE 50 MG
TABLET ORAL
Qty: 2 TABLET | Refills: 0 | Status: SHIPPED | OUTPATIENT
Start: 2025-03-21

## 2025-03-21 RX ORDER — SODIUM CHLORIDE 9 MG/ML
40 INJECTION, SOLUTION INTRAVENOUS AS NEEDED
OUTPATIENT
Start: 2025-03-21

## 2025-03-21 RX ORDER — METOPROLOL TARTRATE 25 MG/1
50 TABLET, FILM COATED ORAL ONCE
OUTPATIENT
Start: 2025-03-21

## 2025-03-25 ENCOUNTER — CLINICAL SUPPORT (OUTPATIENT)
Dept: FAMILY MEDICINE CLINIC | Facility: CLINIC | Age: 70
End: 2025-03-25
Payer: MEDICARE

## 2025-03-25 VITALS — SYSTOLIC BLOOD PRESSURE: 138 MMHG | DIASTOLIC BLOOD PRESSURE: 90 MMHG

## 2025-03-25 DIAGNOSIS — Z01.30 BP CHECK: Primary | ICD-10-CM

## 2025-03-25 NOTE — PROGRESS NOTES
Vitals:    03/25/25 1701 03/25/25 1702 03/25/25 1703   BP: 112/70  Comment: manually 160/95  Comment: home machine 138/90  Comment: manually   BP Location: Right arm Right arm Left arm   Patient Position: Sitting Sitting Sitting   Cuff Size: Large Adult Large Adult Large Adult     Extreme discrepancy between manual and follow-up blood pressure cuff.  Advised patient her home cuff does not seem accurate and she should try to return to store for an exchange for a nonstore brand.    Additionally she has been looking for hidden sodium sources and found her diet sodas, elevated cheese, and canned rotel tomatoes to all have 400-500 or more milligrams of sodium per serving and she will eliminate these from her diet.

## 2025-03-31 ENCOUNTER — OFFICE VISIT (OUTPATIENT)
Dept: FAMILY MEDICINE CLINIC | Facility: CLINIC | Age: 70
End: 2025-03-31
Payer: MEDICARE

## 2025-03-31 ENCOUNTER — TELEPHONE (OUTPATIENT)
Dept: FAMILY MEDICINE CLINIC | Facility: CLINIC | Age: 70
End: 2025-03-31

## 2025-03-31 VITALS
BODY MASS INDEX: 36.33 KG/M2 | TEMPERATURE: 98.4 F | DIASTOLIC BLOOD PRESSURE: 84 MMHG | RESPIRATION RATE: 19 BRPM | HEIGHT: 62 IN | HEART RATE: 61 BPM | SYSTOLIC BLOOD PRESSURE: 142 MMHG | OXYGEN SATURATION: 98 %

## 2025-03-31 DIAGNOSIS — I10 HYPERTENSION, UNSPECIFIED TYPE: Primary | ICD-10-CM

## 2025-03-31 DIAGNOSIS — R93.1 ELEVATED CORONARY ARTERY CALCIUM SCORE: ICD-10-CM

## 2025-03-31 DIAGNOSIS — F41.9 ANXIETY: ICD-10-CM

## 2025-03-31 DIAGNOSIS — I47.10 PAROXYSMAL SUPRAVENTRICULAR TACHYCARDIA: ICD-10-CM

## 2025-03-31 RX ORDER — AMLODIPINE BESYLATE 2.5 MG/1
2.5 TABLET ORAL DAILY
Qty: 30 TABLET | Refills: 0 | Status: SHIPPED | OUTPATIENT
Start: 2025-03-31

## 2025-03-31 NOTE — PROGRESS NOTES
"Chief Complaint  Hypertension    History of Present Illness  Camille Mendoza presents today for new onset concern of elevated blood pressure and feelings of anxiousness.      History of Present Illness  The patient is a 70-year-old female who presents for evaluation of elevated blood pressure, anxiety, and has history of paroxysmal supraventricular tachycardia.    She has been experiencing heart palpitations, which she describes as sudden and transient, often triggered by caffeine intake. She recalls an episode on Tuesday when she woke up with palpitations that persisted even after consuming a banana and yogurt. Her highest recorded blood pressure today was 170/100, measured with another machine, and 158/87 upon arrival at the clinic. She reports no leg swelling but mentions having \"fat ankles.\" She also reports persistent foot pain. She has a history of paroxysmal supraventricular tachycardia. She underwent a cardiac catheterization in 2007, which was unremarkable, but she experienced vertigo the following morning, necessitating an ER visit. An MRI was performed, and a stroke was suspected, but the exact diagnosis remains unclear. The vertigo resolved spontaneously within 24 hours.    She has been preoccupied with her elevated calcium score, which she believes may be contributing to her anxiety. Her calcium scores from [facility] screenings were 121 in 2021, 154 in 2023, and 331 this year. She had a stress test in 2021, which yielded normal results. She expresses concern about her blood pressure readings, which have consistently been around 158 to 160. She admits to consuming alcohol, typically two drinks per session, but not daily. She also consumes organic popcorn with butter and salt. She experiences short-lived episodes of dizziness upon standing after prolonged sitting, which resolve with movement.    She is currently not on any daily medication for anxiety, although she has Xanax available, which she finds " effective. She reports feeling jittery and anxious recently. She describes herself as a positive person but acknowledges that certain individuals in her life contribute to her anxiety.    Supplemental Information  She is currently experiencing an arthritis flare-up and was considering taking Aleve but was advised against it due to its potential to elevate blood pressure.    SOCIAL HISTORY  The patient reports drinking alcohol occasionally, usually two drinks at a time, but not daily.    FAMILY HISTORY  The patient's brother had high blood pressure and a dissected aorta.    MEDICATIONS  Current: Xanax, atorvastatin      Hypertension  Patient does check blood pressure at home.   Home reading today was  170/100 per patient/patient submitted blood pressure diary.  Patient denies  blurred vision, chest pain, headache, and pulsating in the ears   Patient states she has had feelings of nervousness.  EKG done in office today.         Patient Care Team:  Regina Murrieta MD as PCP - General (Family Medicine)   Current Outpatient Medications on File Prior to Visit   Medication Sig    ALPRAZolam (XANAX) 0.5 MG tablet Take 1 tablet by mouth 2 (Two) Times a Day As Needed for Anxiety. Use sparingly.    aspirin 81 MG EC tablet Take 1 tablet by mouth Daily.    atorvastatin (LIPITOR) 80 MG tablet Take 1 tablet by mouth Daily.    Melatonin 10 MG capsule Take 1 capsule by mouth Every Night.    nitroglycerin (NITROSTAT) 0.3 MG SL tablet 1 under the tongue as needed for angina, may repeat q5mins for up three doses    omeprazole (priLOSEC) 20 MG capsule Take 1 capsule by mouth Daily.    metoprolol tartrate (LOPRESSOR) 50 MG tablet Take 50 mg at Bedtime the Night Before Coronary CTA Appointment and In the Morning 1 Hour Prior to Coronary CTA Appointment. Do not take if heart rate less than 60. (Patient not taking: Reported on 3/31/2025)     No current facility-administered medications on file prior to visit.       Objective  "  Vital Signs:   /84 (BP Location: Right arm, Patient Position: Sitting, Cuff Size: Adult)   Pulse 61   Temp 98.4 °F (36.9 °C) (Temporal)   Resp 19   Ht 158 cm (62.21\")   SpO2 98%   BMI 36.33 kg/m²    BP Readings from Last 3 Encounters:   03/31/25 142/84   03/25/25 138/90   03/21/25 156/86     Wt Readings from Last 3 Encounters:   03/21/25 90.7 kg (200 lb)   03/10/25 90.5 kg (199 lb 9.6 oz)   12/02/24 90.4 kg (199 lb 3.2 oz)         Physical Exam  Vitals and nursing note reviewed.   Constitutional:       General: She is not in acute distress.     Appearance: She is well-developed. She is obese.   HENT:      Head: Normocephalic and atraumatic.   Cardiovascular:      Rate and Rhythm: Regular rhythm. Bradycardia present.      Heart sounds: No murmur heard.  Pulmonary:      Effort: Pulmonary effort is normal.      Breath sounds: Normal breath sounds. No wheezing.   Musculoskeletal:         General: Normal range of motion.   Skin:     General: Skin is warm and dry.      Findings: No rash.   Neurological:      Mental Status: She is alert and oriented to person, place, and time.   Psychiatric:      Comments: Somewhat anxious          Physical Exam        No visits with results within 1 Day(s) from this visit.   Latest known visit with results is:   Office Visit on 03/10/2025   Component Date Value Ref Range Status    Color 03/10/2025 Yellow  Yellow, Straw, Dark Yellow, Tiffany Final    Clarity, UA 03/10/2025 Clear  Clear Final    Glucose, UA 03/10/2025 Negative  Negative mg/dL Final    Bilirubin 03/10/2025 Negative  Negative Final    Ketones, UA 03/10/2025 Negative  Negative Final    Specific Gravity  03/10/2025 1.005  1.005 - 1.030 Final    Blood, UA 03/10/2025 Large (A)  Negative Final    pH, Urine 03/10/2025 5.0  5.0 - 8.0 Final    Protein, POC 03/10/2025 Trace (A)  Negative mg/dL Final    Urobilinogen, UA 03/10/2025 Normal  Normal, 0.2 E.U./dL Final    Leukocytes 03/10/2025 Small (1+) (A)  Negative Final    " Nitrite, UA 03/10/2025 Positive (A)  Negative Final    Urine Culture 03/10/2025 Final report (A)   Final    Result 1 03/10/2025 Escherichia coli (A)   Final    Comment: Cefazolin with an ZAC <=16 predicts susceptibility to the oral agents  cefaclor, cefdinir, cefpodoxime, cefprozil, cefuroxime, cephalexin,  and loracarbef when used for therapy of uncomplicated urinary tract  infections due to E. coli, Klebsiella pneumoniae, and Proteus  mirabilis.  50,000-100,000 colony forming units per mL      Result 2 03/10/2025 Comment   Final    Comment: Mixed urogenital nakul  25,000-50,000 colony forming units per mL      Susceptibility Testing 03/10/2025 Comment   Final    Comment:       ** S = Susceptible; I = Intermediate; R = Resistant **                     P = Positive; N = Negative              MICS are expressed in micrograms per mL     Antibiotic                 RSLT#1    RSLT#2    RSLT#3    RSLT#4  Amoxicillin/Clavulanic Acid    S  Ampicillin                     S  Cefazolin                      S  Cefepime                       S  Cefoxitin                      S  Cefpodoxime                    S  Ceftriaxone                    S  Ciprofloxacin                  S  Ertapenem                      S  Gentamicin                     S  Levofloxacin                   S  Meropenem                      S  Nitrofurantoin                 S  Piperacillin/Tazobactam        S  Tetracycline                   S  Tobramycin                     S  Trimethoprim/Sulfa             S      Hemoglobin A1C 03/10/2025 5.5  4.5 - 5.7 % Final    Lot Number 03/10/2025 #[id number]   Final    Expiration Date 03/10/2025 10/31/2026   Final     A1C Last 3 Results          11/21/2024    10:19 3/10/2025    11:43   HGBA1C Last 3 Results   Hemoglobin A1C 6.1  5.5      Lab Results   Component Value Date    CHOL 202 (H) 12/28/2018    CHLPL 177 11/21/2024    TRIG 154 (H) 11/21/2024    HDL 71 11/21/2024    LDL 80 11/21/2024     Lab Results   Component Value  Date    TSH 2.630 11/21/2024     Lab Results   Component Value Date    GLUCOSE 103 (H) 11/21/2024    BUN 11 11/21/2024    CREATININE 0.80 11/21/2024    EGFRIFNONA 70 10/14/2021    EGFRIFAFRI 80 10/14/2021    BCR 14 11/21/2024    K 4.3 11/21/2024    CO2 23 11/21/2024    CALCIUM 9.9 11/21/2024    ALBUMIN 4.7 11/21/2024    AST 15 11/21/2024    ALT 15 11/21/2024     Lab Results   Component Value Date    WBC 7.1 11/21/2024    HGB 14.2 11/21/2024    HCT 43.3 11/21/2024    MCV 94 11/21/2024     11/21/2024             Results  Laboratory Studies  Calcium score was 121 in 2021, 154 in 2023, and 331 in 2025.      ECG 12 Lead    Date/Time: 3/31/2025 5:21 PM  Performed by: Regina Murrieta MD    Authorized by: Regina Murrieta MD  Comparison: compared with previous ECG from 12/2/2024  Similar to previous ECG  Comparison to previous ECG: Similar to previous  Rhythm: sinus bradycardia  Rate: bradycardic  BPM: 56    Clinical impression: non-specific ECG              Assessment and Plan    Diagnoses and all orders for this visit:    1. Hypertension, unspecified type (Primary)  -     ECG 12 Lead  -     amLODIPine (NORVASC) 2.5 MG tablet; Take 1 tablet by mouth Daily.  Dispense: 30 tablet; Refill: 0    2. Paroxysmal supraventricular tachycardia  -     ECG 12 Lead    3. Anxiety    4. Elevated coronary artery calcium score        Assessment & Plan  1. Hypertension.  Her elevated blood pressure could potentially be attributed to anxiety or cardiac-related issues. The goal is to reduce her blood pressure to the range of 120s to 139. She has been advised to limit her alcohol intake to a maximum of one beverage per day and to reduce her sodium consumption. The use of anti-inflammatories such as Aleve has been discouraged due to their potential to elevate blood pressure. Instead, she may consider using Tylenol or topical treatments. A prescription for amlodipine 2.5 mg has been issued through [facility] in [facility]  [place]. She has been instructed to monitor her blood pressure daily, preferably in the morning when she is calm, and to record readings with goal to be below 140/90. If her blood pressure does not decrease within a few days, the dosage of amlodipine can be increased.    2. Paroxysmal supraventricular tachycardia.  She has a history of paroxysmal supraventricular tachycardia, which is currently not being treated with a beta blocker due to her low heart rate. She has been advised to follow up with Dr. Sahu, who may consider using a Holter monitor or event recorder to catch any irregular heartbeats.    3. Anxiety.  She has been experiencing anxiety, which may be contributing to her elevated blood pressure. She has been advised to consider daily medications for anxiety such as Lexapro or BuSpar if her symptoms persist. In the meantime, she has been encouraged to practice meditation, deep breathing exercises, relaxation techniques, and positive visualizations.    Follow-up  The patient is scheduled for a follow-up visit on 04/11/2025.      There are no discontinued medications.      Follow Up     Return in about 11 days (around 4/11/2025).    Patient was given instructions and counseling regarding her condition or for health maintenance advice. Please see specific information pulled into the AVS if appropriate.     Patient or patient representative verbalized consent for the use of Ambient Listening during the visit with  Regina Murrieta MD for chart documentation. 4/2/2025  17:20 EDT

## 2025-04-02 PROBLEM — I10 HYPERTENSION: Status: ACTIVE | Noted: 2025-04-02

## 2025-04-03 ENCOUNTER — OFFICE VISIT (OUTPATIENT)
Age: 70
End: 2025-04-03
Payer: MEDICARE

## 2025-04-03 VITALS — HEIGHT: 62 IN | RESPIRATION RATE: 20 BRPM | BODY MASS INDEX: 36.8 KG/M2 | OXYGEN SATURATION: 98 % | WEIGHT: 200 LBS

## 2025-04-03 DIAGNOSIS — M17.0 PRIMARY OSTEOARTHRITIS OF BOTH KNEES: Primary | ICD-10-CM

## 2025-04-03 RX ORDER — TRIAMCINOLONE ACETONIDE 40 MG/ML
80 INJECTION, SUSPENSION INTRA-ARTICULAR; INTRAMUSCULAR
Status: COMPLETED | OUTPATIENT
Start: 2025-04-03 | End: 2025-04-03

## 2025-04-03 RX ORDER — LIDOCAINE HYDROCHLORIDE 10 MG/ML
2 INJECTION, SOLUTION EPIDURAL; INFILTRATION; INTRACAUDAL; PERINEURAL
Status: COMPLETED | OUTPATIENT
Start: 2025-04-03 | End: 2025-04-03

## 2025-04-03 RX ADMIN — TRIAMCINOLONE ACETONIDE 80 MG: 40 INJECTION, SUSPENSION INTRA-ARTICULAR; INTRAMUSCULAR at 14:58

## 2025-04-03 RX ADMIN — LIDOCAINE HYDROCHLORIDE 2 ML: 10 INJECTION, SOLUTION EPIDURAL; INFILTRATION; INTRACAUDAL; PERINEURAL at 14:58

## 2025-04-03 NOTE — PROGRESS NOTES
INJECTION VISIT    Patient: Camille Mendoza    YOB: 1955    MRN: [id number]    Chief Complaint   Patient presents with    Left Knee - Follow-up          Right Knee - Follow-up       History of Present Illness:   Camille Mendoza is a 70 y.o. year old who presents today for injection of bilateral knees. Last injections were October 2024 and provided good relief of symptoms.         Allergies:   Allergies   Allergen Reactions    Coffee Nausea Only and Palpitations    Meat Extract Hives     TICK Borne Allergy, hives       Medications:   Home Medications:  Current Outpatient Medications on File Prior to Visit   Medication Sig    ALPRAZolam (XANAX) 0.5 MG tablet Take 1 tablet by mouth 2 (Two) Times a Day As Needed for Anxiety. Use sparingly.    amLODIPine (NORVASC) 2.5 MG tablet Take 1 tablet by mouth Daily.    aspirin 81 MG EC tablet Take 1 tablet by mouth Daily.    atorvastatin (LIPITOR) 80 MG tablet Take 1 tablet by mouth Daily.    Melatonin 10 MG capsule Take 1 capsule by mouth Every Night.    nitroglycerin (NITROSTAT) 0.3 MG SL tablet 1 under the tongue as needed for angina, may repeat q5mins for up three doses    omeprazole (priLOSEC) 20 MG capsule Take 1 capsule by mouth Daily.    metoprolol tartrate (LOPRESSOR) 50 MG tablet Take 50 mg at Bedtime the Night Before Coronary CTA Appointment and In the Morning 1 Hour Prior to Coronary CTA Appointment. Do not take if heart rate less than 60. (Patient not taking: Reported on 4/3/2025)     No current facility-administered medications on file prior to visit.         I have reviewed the patient's medical history in detail and updated the computerized patient record.  Review and summarization of old records include:    Past Medical History:   Diagnosis Date    Anxiety     Gastroesophageal reflux disease without esophagitis     Menopausal syndrome     Mixed hyperlipidemia     Osteopenia of multiple sites     Paroxysmal supraventricular tachycardia     Primary  osteoarthritis of both knees     Seasonal allergic rhinitis due to pollen      Past Surgical History:   Procedure Laterality Date    CARDIAC CATHETERIZATION  02/2007    Dr. Paris    CATARACT EXTRACTION, BILATERAL      Abel Simmons    DILATION AND CURETTAGE, DIAGNOSTIC / THERAPEUTIC  1974    KNEE ARTHROSCOPY Left 05/22/2019    [facility] with partial medial and lateral meniscectomy    TONSILLECTOMY AND ADENOIDECTOMY  1959     Social History     Occupational History    Not on file   Tobacco Use    Smoking status: Never     Passive exposure: Never    Smokeless tobacco: Never   Vaping Use    Vaping status: Never Used   Substance and Sexual Activity    Alcohol use: Yes     Alcohol/week: 4.0 standard drinks of alcohol     Types: 4 Drinks containing 0.5 oz of alcohol per week    Drug use: Yes     Types: Marijuana    Sexual activity: Defer      Social History     Social History Narrative    Not on file     Family History   Problem Relation Age of Onset    Arthritis Mother     Skin cancer Mother     Diabetes Father     Heart disease Father     Hypertension Father     Breast cancer Neg Hx     Ovarian cancer Neg Hx                ROS  Negative unless listed in the HPI        Physical Exam  70 y.o. female  Body mass index is 36.58 kg/m²., 90.7 kg (200 lb)  Vitals:    04/03/25 1426   Resp: 20   SpO2: 98%     General: Alert, cooperative, appears well and in no observable distress.   HEENT: Normocephalic, atraumatic on external visual inspection. No icterus.   CV: No significant peripheral edema.   Respiratory: Normal respiratory effort.   Skin: Warm & well perfused; appropriate skin turgor.  Psych: Appropriate mood & affect.  Neuro: Gross sensation and motor intact in affected extremity/extremities.  Vascular: Peripheral pulses palpable in affected extremity/extremities.         Procedure:  Large Joint Arthrocentesis: R knee  Date/Time: 4/3/2025 2:58 PM  Consent given by: patient  Site marked: site marked  Timeout:  Immediately prior to procedure a time out was called to verify the correct patient, procedure, equipment, support staff and site/side marked as required   Supporting Documentation  Indications: pain   Procedure Details  Location: knee - R knee  Preparation: Patient was prepped and draped in the usual sterile fashion  Needle size: 25 G  Approach: anterolateral  Medications administered: 2 mL lidocaine PF 1% 1 %; 80 mg triamcinolone acetonide 40 MG/ML  Patient tolerance: patient tolerated the procedure well with no immediate complications      Large Joint Arthrocentesis: L knee  Date/Time: 4/3/2025 2:58 PM  Consent given by: patient  Site marked: site marked  Timeout: Immediately prior to procedure a time out was called to verify the correct patient, procedure, equipment, support staff and site/side marked as required   Supporting Documentation  Indications: pain and diagnostic evaluation   Procedure Details  Location: knee - L knee  Preparation: Patient was prepped and draped in the usual sterile fashion  Needle size: 25 G  Approach: anterolateral  Medications administered: 2 mL lidocaine PF 1% 1 %; 80 mg triamcinolone acetonide 40 MG/ML  Patient tolerance: patient tolerated the procedure well with no immediate complications            Assessment:   Diagnoses and all orders for this visit:    1. Primary osteoarthritis of both knees (Primary)              Plan:   -     Risks and benefits of injection therapy discussed with patient.    Injected patient's bilateral knee joint(s)with Kenalog from an anterolateral approach   Compression/brace   Rest, ice, compression, and elevation (RICE) therapy  OTC Tylenol for pain PRN  Follow up in 3 months or PRN for repeat injections  Please call with questions or concerns in the interim        Date of encounter: 04/03/2025   DALILA Farris

## 2025-04-04 ENCOUNTER — TELEPHONE (OUTPATIENT)
Dept: CARDIOLOGY | Facility: CLINIC | Age: 70
End: 2025-04-04
Payer: MEDICARE

## 2025-04-04 DIAGNOSIS — R07.9 CHEST PAIN, UNSPECIFIED TYPE: Primary | ICD-10-CM

## 2025-04-04 NOTE — TELEPHONE ENCOUNTER
Patient stopped in at the [place] office and stated that Dr Murrieta and Dr. Garcia feel she should go ahead and have the heart cath instead of the CT Angiogram.

## 2025-04-09 NOTE — PROGRESS NOTES
Chief Complaint  Hypertension    History of Present Illness  Camille Mendoza presents today for follow up on hypertension      Hypertension  Patient does check blood pressure at home.   Home readings range from 140's/70-90's per patient/patient submitted blood pressure diary.  Patient denies  blurred vision, chest pain, dyspnea, headache, neck aches, orthopnea, palpitations, paroxysmal nocturnal dyspnea, peripheral edema, pulsating in the ears, and tiredness/fatigue   Patient reports they are taking medications as prescribed and they are not having side effects.    History of Present Illness  The patient is a 70-year-old female here to follow up on hypertension. Since her last visit, she has started amlodipine 2.5 mg. Blood pressures are now in the 140s/70s to 90s. She is scheduled for a heart catheterization on 04/22/2025 with Dr. Sahu.    She reports that her pulse rate consistently remains below 60, with only two instances of it rising to 69 and 67. Her blood pressure readings have been variable, with one instance of it being 120/64 on 04/03/2025, accompanied by a pulse rate of 64 post-bath. Today, her left arm blood pressure was recorded as 148/90 at 9:45 AM, with a pulse rate of 56. After breakfast, which included toast, her blood pressure dropped to 131/66 at 10:45 AM, with a pulse rate of 60. She typically takes her amlodipine around 8:30 or 9:00 AM upon waking. She does not believe the medication is significantly altering her blood pressure readings. On 04/01/2025, her blood pressure was 130/70, with a pulse rate of 77. Post-bath, she recorded blood pressure readings from both arms. On 04/10/2025, her left arm blood pressure was 141/88, while her right arm reading was 135/75. She also reported a right arm reading of 141/91. On 04/06/2025, her left arm blood pressure was 169/81, and her right arm reading was 151/81. She has been using a wrist monitor for her readings but questions its accuracy due to  discomfort from a tight cuff. She reports no symptoms of lightheadedness, dizziness, or syncope. She maintains an active lifestyle but does not use a treadmill. She has a history of competitive swimming and believes this may contribute to her low pulse rate. She is currently on a statin regimen, Lipitor, which was recently doubled in dosage from 40 mg to 80 mg. She has been compliant with this medication for the past year and has noticed an improvement in her readings. Her LDL level was 80 in November 2024, and her goal is to reduce it to 50. She reports no side effects from the amlodipine. She was previously prescribed metoprolol to take dose prior to CTA scan with dye but was advised against taking it on the day of the procedure. She is considering starting an exercise regimen using a rowing machine and seeks advice on its safety.    She experiences back pain, which she attributes to physical activity such as bending over and standing. She also reports a decrease in strength, noting that tasks such as lifting milk cartons and mulch bags have become more challenging.    MEDICATIONS  Current: Amlodipine, Prilosec, statin.  Discontinued: Metoprolol.      Patient Care Team:  Regina Murrieta MD as PCP - General (Family Medicine)   Current Outpatient Medications on File Prior to Visit   Medication Sig    ALPRAZolam (XANAX) 0.5 MG tablet Take 1 tablet by mouth 2 (Two) Times a Day As Needed for Anxiety. Use sparingly.    aspirin 81 MG EC tablet Take 1 tablet by mouth Daily.    atorvastatin (LIPITOR) 80 MG tablet Take 1 tablet by mouth Daily.    Melatonin 10 MG capsule Take 1 capsule by mouth As Needed.    nitroglycerin (NITROSTAT) 0.3 MG SL tablet 1 under the tongue as needed for angina, may repeat q5mins for up three doses    omeprazole (priLOSEC) 20 MG capsule Take 1 capsule by mouth Daily.    [DISCONTINUED] amLODIPine (NORVASC) 2.5 MG tablet Take 1 tablet by mouth Daily.    [DISCONTINUED] metoprolol tartrate  "(LOPRESSOR) 50 MG tablet Take 50 mg at Bedtime the Night Before Coronary CTA Appointment and In the Morning 1 Hour Prior to Coronary CTA Appointment. Do not take if heart rate less than 60. (Patient not taking: Reported on 3/31/2025)     No current facility-administered medications on file prior to visit.       Objective   Vital Signs:   /84 (BP Location: Right arm, Patient Position: Sitting, Cuff Size: Large Adult)   Pulse 80   Temp 98.4 °F (36.9 °C) (Temporal)   Resp 18   Ht 157.5 cm (62\")   Wt 88.5 kg (195 lb 3.2 oz)   SpO2 98%   BMI 35.70 kg/m²    BP Readings from Last 3 Encounters:   04/11/25 136/84   03/31/25 142/84   03/25/25 138/90     Wt Readings from Last 3 Encounters:   04/11/25 88.5 kg (195 lb 3.2 oz)   04/03/25 90.7 kg (200 lb)   03/21/25 90.7 kg (200 lb)         Physical Exam  Vitals and nursing note reviewed.   Constitutional:       General: She is not in acute distress.     Appearance: Normal appearance. She is well-developed. She is obese. She is not ill-appearing.   HENT:      Head: Normocephalic and atraumatic.   Cardiovascular:      Rate and Rhythm: Normal rate and regular rhythm.      Heart sounds: No murmur heard.  Pulmonary:      Effort: Pulmonary effort is normal.      Breath sounds: Normal breath sounds. No wheezing.   Musculoskeletal:         General: Normal range of motion.   Skin:     General: Skin is warm and dry.      Findings: No rash.   Neurological:      Mental Status: She is alert and oriented to person, place, and time.          Physical Exam        No visits with results within 1 Day(s) from this visit.   Latest known visit with results is:   Office Visit on 03/10/2025   Component Date Value Ref Range Status    Color 03/10/2025 Yellow  Yellow, Straw, Dark Yellow, Tiffany Final    Clarity, UA 03/10/2025 Clear  Clear Final    Glucose, UA 03/10/2025 Negative  Negative mg/dL Final    Bilirubin 03/10/2025 Negative  Negative Final    Ketones, UA 03/10/2025 Negative  Negative " Final    Specific Gravity  03/10/2025 1.005  1.005 - 1.030 Final    Blood, UA 03/10/2025 Large (A)  Negative Final    pH, Urine 03/10/2025 5.0  5.0 - 8.0 Final    Protein, POC 03/10/2025 Trace (A)  Negative mg/dL Final    Urobilinogen, UA 03/10/2025 Normal  Normal, 0.2 E.U./dL Final    Leukocytes 03/10/2025 Small (1+) (A)  Negative Final    Nitrite, UA 03/10/2025 Positive (A)  Negative Final    Urine Culture 03/10/2025 Final report (A)   Final    Result 1 03/10/2025 Escherichia coli (A)   Final    Comment: Cefazolin with an ZAC <=16 predicts susceptibility to the oral agents  cefaclor, cefdinir, cefpodoxime, cefprozil, cefuroxime, cephalexin,  and loracarbef when used for therapy of uncomplicated urinary tract  infections due to E. coli, Klebsiella pneumoniae, and Proteus  mirabilis.  50,000-100,000 colony forming units per mL      Result 2 03/10/2025 Comment   Final    Comment: Mixed urogenital nakul  25,000-50,000 colony forming units per mL      Susceptibility Testing 03/10/2025 Comment   Final    Comment:       ** S = Susceptible; I = Intermediate; R = Resistant **                     P = Positive; N = Negative              MICS are expressed in micrograms per mL     Antibiotic                 RSLT#1    RSLT#2    RSLT#3    RSLT#4  Amoxicillin/Clavulanic Acid    S  Ampicillin                     S  Cefazolin                      S  Cefepime                       S  Cefoxitin                      S  Cefpodoxime                    S  Ceftriaxone                    S  Ciprofloxacin                  S  Ertapenem                      S  Gentamicin                     S  Levofloxacin                   S  Meropenem                      S  Nitrofurantoin                 S  Piperacillin/Tazobactam        S  Tetracycline                   S  Tobramycin                     S  Trimethoprim/Sulfa             S      Hemoglobin A1C 03/10/2025 5.5  4.5 - 5.7 % Final    Lot Number 03/10/2025 #[id number]   Final    Expiration Date  03/10/2025 10/31/2026   Final     A1C Last 3 Results          11/21/2024    10:19 3/10/2025    11:43   HGBA1C Last 3 Results   Hemoglobin A1C 6.1  5.5      Lab Results   Component Value Date    CHOL 202 (H) 12/28/2018    CHLPL 177 11/21/2024    TRIG 154 (H) 11/21/2024    HDL 71 11/21/2024    LDL 80 11/21/2024     Lab Results   Component Value Date    TSH 2.630 11/21/2024     Lab Results   Component Value Date    GLUCOSE 103 (H) 11/21/2024    BUN 11 11/21/2024    CREATININE 0.80 11/21/2024    EGFRIFNONA 70 10/14/2021    EGFRIFAFRI 80 10/14/2021    BCR 14 11/21/2024    K 4.3 11/21/2024    CO2 23 11/21/2024    CALCIUM 9.9 11/21/2024    ALBUMIN 4.7 11/21/2024    AST 15 11/21/2024    ALT 15 11/21/2024     Lab Results   Component Value Date    WBC 7.1 11/21/2024    HGB 14.2 11/21/2024    HCT 43.3 11/21/2024    MCV 94 11/21/2024     11/21/2024             Results  Laboratory Studies  LDL was 80 in November 2024.             Assessment and Plan    Diagnoses and all orders for this visit:    1. Hypertension, unspecified type (Primary)  -     amLODIPine (NORVASC) 5 MG tablet; Take 1 tablet by mouth Daily.  Dispense: 90 tablet; Refill: 3    2. Class 2 obesity with body mass index (BMI) of 35.0 to 35.9 in adult, unspecified obesity type, unspecified whether serious comorbidity present    3. Elevated coronary artery calcium score    4. Mixed hyperlipidemia        Assessment & Plan  1. Hypertension.  Her blood pressure readings have shown variability, with systolic values ranging from 140s to 90s and diastolic values between 70s and 90s. The goal is to achieve a systolic blood pressure below 140. She has been advised to continue her calcium supplementation. The dosage of amlodipine will be increased to 5 mg, and a prescription for a 90-day supply has been provided. She has been encouraged to engage in physical activity, such as using her rowing machine, but to start slowly and monitor for any chest pain or shortness of  breath. The metoprolol which was to be used prior to CTA has been removed from her medication list.    2. Bradycardia.  Her heart rate has consistently remained above 50, typically in the 60s.  She is not currently on any medications that could potentially slow down her heart rate. She has been informed that her low pulse rate is not a cause for concern at this time. Following her heart catheterization with Dr. Sahu, further discussion will be held regarding the potential need for an event recorder or Holter monitor to rule out significant bradycardia arrhythmias    3. Elevated cholesterol.  She is currently on a statin regimen, which was recently doubled in dosage. She has been compliant with this medication for the past year and has noticed an improvement in her readings. Her LDL level was 80 in November 2024, and her goal is to reduce it to 50.    4. Back pain.  She experiences back pain, which she attributes to physical activity such as bending over and standing.  This is most certainly of musculoskeletal origin and adjusting how she performs certain activities to prevent further strain is recommended      Medications Discontinued During This Encounter   Medication Reason    metoprolol tartrate (LOPRESSOR) 50 MG tablet Other- See Medication Note    amLODIPine (NORVASC) 2.5 MG tablet Reorder         Follow Up     Return in about 2 months (around 6/11/2025) for htn prediabetes cad etc.    Patient was given instructions and counseling regarding her condition or for health maintenance advice. Please see specific information pulled into the AVS if appropriate.     Patient or patient representative verbalized consent for the use of Ambient Listening during the visit with  Regina Murrieta MD for chart documentation. 4/11/2025  17:54 EDT

## 2025-04-11 ENCOUNTER — OFFICE VISIT (OUTPATIENT)
Dept: FAMILY MEDICINE CLINIC | Facility: CLINIC | Age: 70
End: 2025-04-11
Payer: MEDICARE

## 2025-04-11 VITALS
RESPIRATION RATE: 18 BRPM | HEART RATE: 80 BPM | TEMPERATURE: 98.4 F | SYSTOLIC BLOOD PRESSURE: 136 MMHG | WEIGHT: 195.2 LBS | DIASTOLIC BLOOD PRESSURE: 84 MMHG | BODY MASS INDEX: 35.92 KG/M2 | HEIGHT: 62 IN | OXYGEN SATURATION: 98 %

## 2025-04-11 DIAGNOSIS — I10 HYPERTENSION, UNSPECIFIED TYPE: Primary | ICD-10-CM

## 2025-04-11 DIAGNOSIS — E66.812 CLASS 2 OBESITY WITH BODY MASS INDEX (BMI) OF 35.0 TO 35.9 IN ADULT, UNSPECIFIED OBESITY TYPE, UNSPECIFIED WHETHER SERIOUS COMORBIDITY PRESENT: ICD-10-CM

## 2025-04-11 DIAGNOSIS — R93.1 ELEVATED CORONARY ARTERY CALCIUM SCORE: ICD-10-CM

## 2025-04-11 DIAGNOSIS — E78.2 MIXED HYPERLIPIDEMIA: ICD-10-CM

## 2025-04-11 RX ORDER — AMLODIPINE BESYLATE 5 MG/1
5 TABLET ORAL DAILY
Qty: 90 TABLET | Refills: 3 | Status: SHIPPED | OUTPATIENT
Start: 2025-04-11

## 2025-04-19 ENCOUNTER — LAB (OUTPATIENT)
Dept: LAB | Facility: HOSPITAL | Age: 70
End: 2025-04-19
Payer: MEDICARE

## 2025-04-19 DIAGNOSIS — R07.9 CHEST PAIN, UNSPECIFIED TYPE: ICD-10-CM

## 2025-04-19 LAB
ALBUMIN SERPL-MCNC: 4.3 G/DL (ref 3.5–5.2)
ALBUMIN/GLOB SERPL: 1.7 G/DL
ALP SERPL-CCNC: 112 U/L (ref 39–117)
ALT SERPL W P-5'-P-CCNC: 16 U/L (ref 1–33)
ANION GAP SERPL CALCULATED.3IONS-SCNC: 11.2 MMOL/L (ref 5–15)
APTT PPP: 24.5 SECONDS (ref 22.7–35.4)
AST SERPL-CCNC: 20 U/L (ref 1–32)
BASOPHILS # BLD AUTO: 0.02 10*3/MM3 (ref 0–0.2)
BASOPHILS NFR BLD AUTO: 0.3 % (ref 0–1.5)
BILIRUB SERPL-MCNC: 0.4 MG/DL (ref 0–1.2)
BUN SERPL-MCNC: 12 MG/DL (ref 8–23)
BUN/CREAT SERPL: 16.2 (ref 7–25)
CALCIUM SPEC-SCNC: 9.5 MG/DL (ref 8.6–10.5)
CHLORIDE SERPL-SCNC: 104 MMOL/L (ref 98–107)
CO2 SERPL-SCNC: 23.8 MMOL/L (ref 22–29)
CREAT SERPL-MCNC: 0.74 MG/DL (ref 0.57–1)
DEPRECATED RDW RBC AUTO: 41 FL (ref 37–54)
EGFRCR SERPLBLD CKD-EPI 2021: 86.1 ML/MIN/1.73
EOSINOPHIL # BLD AUTO: 0.13 10*3/MM3 (ref 0–0.4)
EOSINOPHIL NFR BLD AUTO: 1.7 % (ref 0.3–6.2)
ERYTHROCYTE [DISTWIDTH] IN BLOOD BY AUTOMATED COUNT: 12.2 % (ref 12.3–15.4)
GLOBULIN UR ELPH-MCNC: 2.5 GM/DL
GLUCOSE SERPL-MCNC: 102 MG/DL (ref 65–99)
HCT VFR BLD AUTO: 39.6 % (ref 34–46.6)
HGB BLD-MCNC: 13 G/DL (ref 12–15.9)
IMM GRANULOCYTES # BLD AUTO: 0.03 10*3/MM3 (ref 0–0.05)
IMM GRANULOCYTES NFR BLD AUTO: 0.4 % (ref 0–0.5)
INR PPP: 0.91 (ref 0.9–1.1)
LYMPHOCYTES # BLD AUTO: 1.83 10*3/MM3 (ref 0.7–3.1)
LYMPHOCYTES NFR BLD AUTO: 23.8 % (ref 19.6–45.3)
MCH RBC QN AUTO: 30 PG (ref 26.6–33)
MCHC RBC AUTO-ENTMCNC: 32.8 G/DL (ref 31.5–35.7)
MCV RBC AUTO: 91.5 FL (ref 79–97)
MONOCYTES # BLD AUTO: 0.52 10*3/MM3 (ref 0.1–0.9)
MONOCYTES NFR BLD AUTO: 6.8 % (ref 5–12)
NEUTROPHILS NFR BLD AUTO: 5.15 10*3/MM3 (ref 1.7–7)
NEUTROPHILS NFR BLD AUTO: 67 % (ref 42.7–76)
NRBC BLD AUTO-RTO: 0 /100 WBC (ref 0–0.2)
PLATELET # BLD AUTO: 237 10*3/MM3 (ref 140–450)
PMV BLD AUTO: 10.2 FL (ref 6–12)
POTASSIUM SERPL-SCNC: 4.2 MMOL/L (ref 3.5–5.2)
PROT SERPL-MCNC: 6.8 G/DL (ref 6–8.5)
PROTHROMBIN TIME: 12.1 SECONDS (ref 11.7–14.2)
RBC # BLD AUTO: 4.33 10*6/MM3 (ref 3.77–5.28)
SODIUM SERPL-SCNC: 139 MMOL/L (ref 136–145)
WBC NRBC COR # BLD AUTO: 7.68 10*3/MM3 (ref 3.4–10.8)

## 2025-04-19 PROCEDURE — 36415 COLL VENOUS BLD VENIPUNCTURE: CPT

## 2025-04-19 PROCEDURE — 85610 PROTHROMBIN TIME: CPT

## 2025-04-19 PROCEDURE — 85025 COMPLETE CBC W/AUTO DIFF WBC: CPT

## 2025-04-19 PROCEDURE — 80053 COMPREHEN METABOLIC PANEL: CPT

## 2025-04-19 PROCEDURE — 85730 THROMBOPLASTIN TIME PARTIAL: CPT

## 2025-04-22 ENCOUNTER — HOSPITAL ENCOUNTER (OUTPATIENT)
Facility: HOSPITAL | Age: 70
Setting detail: HOSPITAL OUTPATIENT SURGERY
Discharge: HOME | End: 2025-04-22
Attending: INTERNAL MEDICINE | Admitting: INTERNAL MEDICINE
Payer: MEDICARE

## 2025-04-22 VITALS
HEIGHT: 62 IN | BODY MASS INDEX: 36.47 KG/M2 | WEIGHT: 198.2 LBS | DIASTOLIC BLOOD PRESSURE: 71 MMHG | RESPIRATION RATE: 16 BRPM | OXYGEN SATURATION: 95 % | HEART RATE: 57 BPM | TEMPERATURE: 97.9 F | SYSTOLIC BLOOD PRESSURE: 107 MMHG

## 2025-04-22 DIAGNOSIS — R07.9 CHEST PAIN, UNSPECIFIED TYPE: ICD-10-CM

## 2025-04-22 PROCEDURE — 25010000002 FENTANYL CITRATE (PF) 100 MCG/2ML SOLUTION: Performed by: INTERNAL MEDICINE

## 2025-04-22 PROCEDURE — 99214 OFFICE O/P EST MOD 30 MIN: CPT | Performed by: INTERNAL MEDICINE

## 2025-04-22 PROCEDURE — 25510000001 IOPAMIDOL PER 1 ML: Performed by: INTERNAL MEDICINE

## 2025-04-22 PROCEDURE — C1769 GUIDE WIRE: HCPCS | Performed by: INTERNAL MEDICINE

## 2025-04-22 PROCEDURE — C1894 INTRO/SHEATH, NON-LASER: HCPCS | Performed by: INTERNAL MEDICINE

## 2025-04-22 PROCEDURE — 93458 L HRT ARTERY/VENTRICLE ANGIO: CPT | Performed by: INTERNAL MEDICINE

## 2025-04-22 PROCEDURE — 25010000002 MIDAZOLAM PER 1 MG: Performed by: INTERNAL MEDICINE

## 2025-04-22 PROCEDURE — 25010000002 LIDOCAINE 1 % SOLUTION: Performed by: INTERNAL MEDICINE

## 2025-04-22 RX ORDER — IOPAMIDOL 755 MG/ML
INJECTION, SOLUTION INTRAVASCULAR
Status: DISCONTINUED | OUTPATIENT
Start: 2025-04-22 | End: 2025-04-22 | Stop reason: HOSPADM

## 2025-04-22 RX ORDER — ONDANSETRON 2 MG/ML
4 INJECTION INTRAMUSCULAR; INTRAVENOUS EVERY 6 HOURS PRN
Status: DISCONTINUED | OUTPATIENT
Start: 2025-04-22 | End: 2025-04-22 | Stop reason: HOSPADM

## 2025-04-22 RX ORDER — ALUMINA, MAGNESIA, AND SIMETHICONE 2400; 2400; 240 MG/30ML; MG/30ML; MG/30ML
15 SUSPENSION ORAL EVERY 6 HOURS PRN
Status: DISCONTINUED | OUTPATIENT
Start: 2025-04-22 | End: 2025-04-22 | Stop reason: HOSPADM

## 2025-04-22 RX ORDER — ACETAMINOPHEN 325 MG/1
650 TABLET ORAL EVERY 4 HOURS PRN
Status: DISCONTINUED | OUTPATIENT
Start: 2025-04-22 | End: 2025-04-22 | Stop reason: HOSPADM

## 2025-04-22 RX ORDER — FENTANYL CITRATE 50 UG/ML
INJECTION, SOLUTION INTRAMUSCULAR; INTRAVENOUS
Status: DISCONTINUED | OUTPATIENT
Start: 2025-04-22 | End: 2025-04-22 | Stop reason: HOSPADM

## 2025-04-22 RX ORDER — ONDANSETRON 4 MG/1
4 TABLET, ORALLY DISINTEGRATING ORAL EVERY 6 HOURS PRN
Status: DISCONTINUED | OUTPATIENT
Start: 2025-04-22 | End: 2025-04-22 | Stop reason: HOSPADM

## 2025-04-22 RX ORDER — NITROGLYCERIN 0.4 MG/1
0.4 TABLET SUBLINGUAL
Status: DISCONTINUED | OUTPATIENT
Start: 2025-04-22 | End: 2025-04-22 | Stop reason: HOSPADM

## 2025-04-22 RX ORDER — MIDAZOLAM HYDROCHLORIDE 1 MG/ML
INJECTION, SOLUTION INTRAMUSCULAR; INTRAVENOUS
Status: DISCONTINUED | OUTPATIENT
Start: 2025-04-22 | End: 2025-04-22 | Stop reason: HOSPADM

## 2025-04-22 RX ORDER — DIPHENHYDRAMINE HCL 25 MG
25 CAPSULE ORAL EVERY 6 HOURS PRN
Status: DISCONTINUED | OUTPATIENT
Start: 2025-04-22 | End: 2025-04-22 | Stop reason: HOSPADM

## 2025-04-22 RX ORDER — LIDOCAINE HYDROCHLORIDE 10 MG/ML
INJECTION, SOLUTION INFILTRATION; PERINEURAL
Status: DISCONTINUED | OUTPATIENT
Start: 2025-04-22 | End: 2025-04-22 | Stop reason: HOSPADM

## 2025-04-22 NOTE — H&P
Referring Provider: Dr. Murrieta    Attending: Boris Sahu MD    Reason for Consultation:    Chest pain  Shortness of breath  Coronary calcification    Chief complaint:    Chest pain  Shortness of breath       History of present illness:     Patient is 70 years old white female whose past medical history is significant for hyperlipidemia, who was referred to me for for abnormal coronary CT calcification test.  Patient came today for elective cardiac catheterization.    Patient complain of exertional chest pain.  Patient was additionally recommended to have CTA but because of ongoing chest pain patient was recommended to have cardiac catheterization.  Risk and benefit and intermittent options were explained to the patient.     In 2004, patient underwent cardiac catheterization and no significant coronary artery disease was noted.  Patient reports that lately she is complaining of chest discomfort described as a pressure-like sensation when she walks uphill or upstairs.  On a flat ground she does not report any chest pain.  Patient does have shortness of breath.  No orthopnea and PND no syncope or presyncope.  No leg edema noted.     Patient does not have previous history of CAD, PCI or MI.  Patient does not smoke or abuse alcohol.  Patient has family history for coronary artery disease with father had multiple stents.    Review of Systems  Review of Systems   Constitutional: Negative for chills and fever.   HENT:  Negative for ear discharge and nosebleeds.    Eyes:  Negative for discharge and redness.   Cardiovascular:  Positive for chest pain. Negative for orthopnea, palpitations, paroxysmal nocturnal dyspnea and syncope.   Respiratory:  Positive for shortness of breath. Negative for cough and wheezing.    Endocrine: Negative for heat intolerance.   Skin:  Negative for rash.   Musculoskeletal:  Negative for arthritis and myalgias.   Gastrointestinal:  Negative for abdominal pain, melena, nausea and vomiting.    Genitourinary:  Negative for dysuria and hematuria.   Neurological:  Negative for dizziness, light-headedness, numbness and tremors.   Psychiatric/Behavioral:  Negative for depression. The patient is not nervous/anxious.        Past Medical History  Past Medical History:   Diagnosis Date    Anxiety     Gastroesophageal reflux disease without esophagitis     Menopausal syndrome     Mixed hyperlipidemia     Osteopenia of multiple sites     Paroxysmal supraventricular tachycardia     Primary osteoarthritis of both knees     Seasonal allergic rhinitis due to pollen     and   Past Surgical History:   Procedure Laterality Date    CARDIAC CATHETERIZATION  02/2007    Dr. Paris    CATARACT EXTRACTION, BILATERAL      Abel Simmons    DILATION AND CURETTAGE, DIAGNOSTIC / THERAPEUTIC  1974    KNEE ARTHROSCOPY Left 05/22/2019    [facility] with partial medial and lateral meniscectomy    TONSILLECTOMY AND ADENOIDECTOMY  1959       Family History  Family History   Problem Relation Age of Onset    Arthritis Mother     Skin cancer Mother     Diabetes Father     Heart disease Father     Hypertension Father     Breast cancer Neg Hx     Ovarian cancer Neg Hx        Social History  Social History     Socioeconomic History    Marital status:    Tobacco Use    Smoking status: Never     Passive exposure: Never    Smokeless tobacco: Never   Vaping Use    Vaping status: Never Used   Substance and Sexual Activity    Alcohol use: Yes     Alcohol/week: 4.0 standard drinks of alcohol     Types: 4 Drinks containing 0.5 oz of alcohol per week    Drug use: Yes     Types: Marijuana    Sexual activity: Defer       Objective     Physical Exam:    Vital Signs  There were no vitals filed for this visit.    Weight       Constitutional:       Appearance: Well-developed.   Eyes:      General: No scleral icterus.        Right eye: No discharge.   HENT:      Head: Normocephalic and atraumatic.   Neck:      Thyroid: No thyromegaly.       Lymphadenopathy: No cervical adenopathy.   Pulmonary:      Effort: Pulmonary effort is normal. No respiratory distress.      Breath sounds: Normal breath sounds. No wheezing. No rales.   Cardiovascular:      Normal rate. Regular rhythm.      No gallop.    Edema:     Peripheral edema absent.   Abdominal:      Tenderness: There is no abdominal tenderness.   Skin:     Findings: No erythema or rash.   Neurological:      Mental Status: Alert and oriented to person, place, and time.         Results Review:  Lab Results (last 24 hours)       ** No results found for the last 24 hours. **          Imaging Results (Last 72 Hours)       ** No results found for the last 72 hours. **          No orders to display     CBC    Results from last 7 days   Lab Units 04/19/25  1011   WBC 10*3/mm3 7.68   HEMOGLOBIN g/dL 13.0   PLATELETS 10*3/mm3 237     BMP   Results from last 7 days   Lab Units 04/19/25  1011   SODIUM mmol/L 139   POTASSIUM mmol/L 4.2   CHLORIDE mmol/L 104   CO2 mmol/L 23.8   BUN mg/dL 12   CREATININE mg/dL 0.74   GLUCOSE mg/dL 102*     CMP   Results from last 7 days   Lab Units 04/19/25  1011   SODIUM mmol/L 139   POTASSIUM mmol/L 4.2   CHLORIDE mmol/L 104   CO2 mmol/L 23.8   BUN mg/dL 12   CREATININE mg/dL 0.74   GLUCOSE mg/dL 102*   ALBUMIN g/dL 4.3   BILIRUBIN mg/dL 0.4   ALK PHOS U/L 112   AST (SGOT) U/L 20   ALT (SGPT) U/L 16     Medication Review  Scheduled Meds:  Continuous Infusions:No current facility-administered medications for this encounter.    PRN Meds:.    Assessment:      Chest pain        Plan:    MDM:    1.  Chest pain:    I would recommend to proceed with cardiac catheterization risk and benefit and alternative options were explained    2.  Coronary calcification:    Patient was noted to have coronary calcification and was noted to have total score of 314.  Patient is having ongoing chest pain and cardiac catheterization would be done.    3.  Hypertension:    Current treatment would be  continued    4.  Mixed hyperlipidemia:    Patient is on statin therapy.    Electronically signed by Boris Sahu MD, 04/22/25, 1:18 PM EDT.      I discussed the patient's findings and my recommendations with patient    Boris Sahu MD  04/22/25  13:16 EDT

## 2025-04-28 ENCOUNTER — TRANSCRIBE ORDERS (OUTPATIENT)
Dept: ADMINISTRATIVE | Facility: HOSPITAL | Age: 70
End: 2025-04-28
Payer: MEDICARE

## 2025-04-28 ENCOUNTER — HOSPITAL ENCOUNTER (OUTPATIENT)
Dept: CARDIOLOGY | Facility: HOSPITAL | Age: 70
Discharge: HOME | End: 2025-04-28
Admitting: INTERNAL MEDICINE
Payer: MEDICARE

## 2025-04-28 DIAGNOSIS — I72.9 PSEUDOANEURYSM: ICD-10-CM

## 2025-04-28 DIAGNOSIS — R10.31 RIGHT INGUINAL PAIN: ICD-10-CM

## 2025-04-28 DIAGNOSIS — I10 HYPERTENSION, UNSPECIFIED TYPE: ICD-10-CM

## 2025-04-28 DIAGNOSIS — T88.8XXA OTHER SPECIFIED COMPLICATIONS OF SURGICAL AND MEDICAL CARE, NOT ELSEWHERE CLASSIFIED, INITIAL ENCOUNTER: ICD-10-CM

## 2025-04-28 DIAGNOSIS — I72.9 PSEUDOANEURYSM: Primary | ICD-10-CM

## 2025-04-28 LAB
BH CV LEA RIGHT SFA PROX PSV: -103 CM/S
BH CV RIGHT GROIN PSA PROCEDURE SCRIPTING LRR: 1
BH CV VAS PRELIMINARY FINDINGS SCRIPTING: 1
BH CV XLRA MEAS EXT ILIAC A PSV RIGHT: 203 CM/SEC
PROX PFA PSV RIGHT: 65.3 CM/SEC
PROX SFA PSV RIGHT: 103 CM/SEC
RIGHT GROIN CFA SYS: 119 CM/SEC

## 2025-04-28 PROCEDURE — 93926 LOWER EXTREMITY STUDY: CPT

## 2025-04-28 PROCEDURE — 93926 LOWER EXTREMITY STUDY: CPT | Performed by: SURGERY

## 2025-04-28 RX ORDER — AMLODIPINE BESYLATE 2.5 MG/1
2.5 TABLET ORAL DAILY
Qty: 30 TABLET | Refills: 0 | OUTPATIENT
Start: 2025-04-28

## 2025-05-02 ENCOUNTER — TELEPHONE (OUTPATIENT)
Dept: FAMILY MEDICINE CLINIC | Facility: CLINIC | Age: 70
End: 2025-05-02
Payer: MEDICARE

## 2025-05-02 NOTE — PROGRESS NOTES
Chief Complaint  Hospital Follow Up Visit and Anxiety    History of Present Illness  Camille Mendoza presents today for follow up on heart cath and anxiety    Camille was seen at [facility] .  She was admitted on 4/22/25 for abnormal coronary CT calcification test.   She was discharged on 4/22/25.   Discharge diagnosis was chest pain.   Currently Camille receives care at home.   Complications from the hospital stay include cardiac.   The patient stated that they do not need help with their daily life and activities.   The patient stated that they do have emotional support at home.  Patient received a left heart cath by Dr. Sahu at time of admission.    Anxiety/Depression  Associated symptoms include: anxiety   Severity is described per patient : Mild  Patient reports they are taking medications as prescribed and they are not having side effects.    History of Present Illness  The patient is a 70-year-old female here for a hospital follow-up and anxiety.    She was admitted to [facility] under the care of Dr. Sahu on 04/22/2025 for a heart catheterization. The procedure showed a normal left main coronary artery, 10 to 20 percent plaque in the proximal and mid-segment of the left anterior descending artery, 10 percent plaque in the mid-segment of the left circumflex artery, and 10 to 20 percent plaque in the proximal and mid-segment of the right coronary artery. There was no high-grade stenosis. Dr. Sahu increased her atorvastatin dosage to 80 mg and prescribed nitroglycerin for use as needed. She has not required the use of nitroglycerin since her last visit.     She reports experiencing back pain, which she attributes to her physical activities. The pain is described as a pressure sensation, typically alleviated by the application of a heating pad. She recalls an incident during a vascular ultrasound where she experienced severe back muscle pain while lying down, which eventually subsided. She has a  history of muscle relaxant use but nothing recently. She also reports groin swelling, soreness, and bruising, which is more noticeable when bending over. She experienced sharp groin pain while driving yesterday. She occasionally experiences palpitations, which are not associated with her back pain. Her back pain is located above the level of her bra strap and sometimes extends to her neck. She has a follow-up appointment with Dr. Sahu scheduled for 05/23/2025.    For anxiety, she was previously prescribed Xanax by her former primary care physician, Dr. Garcia, most recently in 12/2024, and is now requesting a refill. She has been prescribed Xanax by Dr. Garcia in the past, with a 30-day supply lasting her between 6 months to a year. She recently requested a refill in anticipation of an upcoming flight to [place]. She has 2 doses remaining. She typically takes one dose after 3 hours of sleep deprivation. She has not tried other medications for anxiety, such as Zoloft or Prozac, and does not believe she needs them. She does not consider her anxiety to be constant and does not feel it affects her personality. She does not report any symptoms of depression.    FAMILY HISTORY  Her mother used nitroglycerin for heart issues as she got older.  Her father developed diabetes after [facility] and experienced various health issues.  Her brother had heart issues.      Patient Care Team:  Regina Murrieta MD as PCP - General (Family Medicine)   Current Outpatient Medications on File Prior to Visit   Medication Sig    amLODIPine (NORVASC) 5 MG tablet Take 1 tablet by mouth Daily.    aspirin 81 MG EC tablet Take 1 tablet by mouth Daily.    atorvastatin (LIPITOR) 80 MG tablet Take 1 tablet by mouth Daily.    Melatonin 10 MG capsule Take 1 capsule by mouth As Needed.    nitroglycerin (NITROSTAT) 0.3 MG SL tablet 1 under the tongue as needed for angina, may repeat q5mins for up three doses    omeprazole (priLOSEC) 20 MG  "capsule Take 1 capsule by mouth Daily.    [DISCONTINUED] ALPRAZolam (XANAX) 0.5 MG tablet Take 1 tablet by mouth 2 (Two) Times a Day As Needed for Anxiety. Use sparingly.     No current facility-administered medications on file prior to visit.       Objective   Vital Signs:   /70 (BP Location: Right arm, Patient Position: Sitting, Cuff Size: Large Adult)   Pulse 87   Temp 97.5 °F (36.4 °C) (Temporal)   Resp 18   Ht 157.5 cm (62\")   Wt 89.6 kg (197 lb 9.6 oz)   SpO2 99%   BMI 36.14 kg/m²    BP Readings from Last 3 Encounters:   05/05/25 120/70   04/22/25 107/71   04/11/25 136/84     Wt Readings from Last 3 Encounters:   05/05/25 89.6 kg (197 lb 9.6 oz)   04/22/25 89.9 kg (198 lb 3.2 oz)   04/11/25 88.5 kg (195 lb 3.2 oz)         Physical Exam  Vitals and nursing note reviewed.   Constitutional:       General: She is not in acute distress.     Appearance: Normal appearance. She is well-developed. She is obese. She is not ill-appearing.   HENT:      Head: Normocephalic and atraumatic.   Cardiovascular:      Rate and Rhythm: Normal rate and regular rhythm.      Heart sounds: Normal heart sounds. No murmur heard.  Pulmonary:      Effort: Pulmonary effort is normal.      Breath sounds: Normal breath sounds. No wheezing.   Musculoskeletal:         General: Tenderness present. Normal range of motion.      Comments: Neck and posterior shoulder muscles are tight and somewhat tender to palpation.  Normal range of motion of neck shoulders and extremities   Skin:     General: Skin is warm and dry.      Findings: No rash.      Comments: There is a large, fading bruise with a 1 1/2 cm subcutaneous nontender firm nodule consistent with a small thrombosis in the right groin area.   Neurological:      Mental Status: She is alert and oriented to person, place, and time.   Psychiatric:         Mood and Affect: Mood normal.         Behavior: Behavior normal.         Thought Content: Thought content normal.          Physical " Exam        No visits with results within 1 Day(s) from this visit.   Latest known visit with results is:   Hospital Outpatient Visit on 04/28/2025   Component Date Value Ref Range Status    Right groin CFA sys 04/28/2025 119.0  cm/sec Final    PROX SFA PSV RIGHT 04/28/2025 103.0  cm/sec Final    PROX PFA PSV RIGHT 04/28/2025 65.3  cm/sec Final    BH CV RIGHT GROIN PSA PROCEDURE SC* 04/28/2025 1.00   Final    SFA Prox PSV-Right 04/28/2025 -103.0  cm/s Final    EXT ILIAC A PSV RIGHT 04/28/2025 203.0  cm/sec Final    BH CV VAS PRELIMINARY FINDINGS SCR* 04/28/2025 1.0   Final     A1C Last 3 Results          11/21/2024    10:19 3/10/2025    11:43   HGBA1C Last 3 Results   Hemoglobin A1C 6.1  5.5      Lab Results   Component Value Date    CHOL 202 (H) 12/28/2018    CHLPL 177 11/21/2024    TRIG 154 (H) 11/21/2024    HDL 71 11/21/2024    LDL 80 11/21/2024     Lab Results   Component Value Date    TSH 2.630 11/21/2024     Lab Results   Component Value Date    GLUCOSE 102 (H) 04/19/2025    BUN 12 04/19/2025    CREATININE 0.74 04/19/2025    EGFRIFNONA 70 10/14/2021    EGFRIFAFRI 80 10/14/2021    BCR 16.2 04/19/2025    K 4.2 04/19/2025    CO2 23.8 04/19/2025    CALCIUM 9.5 04/19/2025    ALBUMIN 4.3 04/19/2025    AST 20 04/19/2025    ALT 16 04/19/2025     Lab Results   Component Value Date    WBC 7.68 04/19/2025    HGB 13.0 04/19/2025    HCT 39.6 04/19/2025    MCV 91.5 04/19/2025     04/19/2025             Results  Imaging   - Heart catheterization: 04/22/2025, Normal left main coronary artery, 10 to 20% plaque in proximal and mid segment of the left anterior descending artery, left circumflex has 10% plaque in the mid segment and right coronary artery, 10 to 20% plaque in the proximal and mid segment. No high grade stenosis.   - Duplex scan of the lower extremity: 04/28/2025, No evidence of a pseudoaneurysm or AV fistula in the right groin.             Assessment and Plan    Diagnoses and all orders for this  visit:    1. Hospital discharge follow-up (Primary)    2. Anxiety  -     ALPRAZolam (XANAX) 0.5 MG tablet; Take 1 tablet by mouth 2 (Two) Times a Day As Needed for Anxiety. Use sparingly.  Dispense: 30 tablet; Refill: 0    3. Body mass index (BMI) of 36.0 to 36.9 in adult    4. Hematoma of groin, initial encounter    5. Coronary artery disease involving native coronary artery of native heart without angina pectoris  Comments:  cath 04/22/25, 10 to 20% plaque left anterior descending artery, left circumflex has 10% plaqueand right coronary artery, 10 to 20% plaque    6. Paroxysmal supraventricular tachycardia    7. Primary hypertension        Assessment & Plan  1. Post-hospitalization follow-up.  - The patient's back pain appears to be musculoskeletal in nature, likely due to muscle strain.  - She was encouraged to engage in neck stretching exercises and continue using heat therapy.    - The presence of a hematoma in the groin region was noted. She was advised to administer heat to the affected area for 10 to 15 minutes at a time to facilitate clot dissolution.  - A weight lifting limit of no more than 20 pounds was recommended. She was cautioned against activities that could increase abdominal pressure, such as crunches or sit-ups.  - Keep follow-up with Dr. Sahu as scheduled in 2 weeks.    2. Anxiety.  - She was informed that if her Xanax usage increases to daily or more than three times a week, she should notify us so that alternative treatments can be considered.  - A prescription refill for Xanax was provided.  - Prescription Drug Monitoring Program was reviewed.  - She reported that a 30-day prescription usually lasts her between 6 months to a year, indicating infrequent use.      Medications Discontinued During This Encounter   Medication Reason    ALPRAZolam (XANAX) 0.5 MG tablet Reorder         Follow Up     Return in about 3 months (around 8/5/2025) for htn, anxiety.    Patient was given instructions and  counseling regarding her condition or for health maintenance advice. Please see specific information pulled into the AVS if appropriate.     Patient or patient representative verbalized consent for the use of Ambient Listening during the visit with  Regina Murrieta MD for chart documentation. 5/5/2025  13:03 EDT

## 2025-05-05 ENCOUNTER — OFFICE VISIT (OUTPATIENT)
Dept: FAMILY MEDICINE CLINIC | Facility: CLINIC | Age: 70
End: 2025-05-05
Payer: MEDICARE

## 2025-05-05 VITALS
DIASTOLIC BLOOD PRESSURE: 70 MMHG | RESPIRATION RATE: 18 BRPM | SYSTOLIC BLOOD PRESSURE: 120 MMHG | HEART RATE: 87 BPM | WEIGHT: 197.6 LBS | HEIGHT: 62 IN | OXYGEN SATURATION: 99 % | BODY MASS INDEX: 36.36 KG/M2 | TEMPERATURE: 97.5 F

## 2025-05-05 DIAGNOSIS — Z09 HOSPITAL DISCHARGE FOLLOW-UP: Primary | ICD-10-CM

## 2025-05-05 DIAGNOSIS — I10 PRIMARY HYPERTENSION: ICD-10-CM

## 2025-05-05 DIAGNOSIS — I25.10 CORONARY ARTERY DISEASE INVOLVING NATIVE CORONARY ARTERY OF NATIVE HEART WITHOUT ANGINA PECTORIS: ICD-10-CM

## 2025-05-05 DIAGNOSIS — S30.1XXA HEMATOMA OF GROIN, INITIAL ENCOUNTER: ICD-10-CM

## 2025-05-05 DIAGNOSIS — I47.10 PAROXYSMAL SUPRAVENTRICULAR TACHYCARDIA: ICD-10-CM

## 2025-05-05 DIAGNOSIS — F41.9 ANXIETY: ICD-10-CM

## 2025-05-05 RX ORDER — ALPRAZOLAM 0.5 MG
0.5 TABLET ORAL 2 TIMES DAILY PRN
Qty: 30 TABLET | Refills: 0 | Status: SHIPPED | OUTPATIENT
Start: 2025-05-05

## 2025-05-29 NOTE — PROGRESS NOTES
Date of Office Visit: 2025  Encounter Provider: Dr. Boris Sahu  Place of Service: [facility] [place]  Patient Name: Camille Mendoza  :1955  Regina Murrieta MD    Chief Complaint   Patient presents with    Coronary Artery Disease    Hyperlipidemia    Follow-up     History of Present Illness    I am pleased to see Mrs. Mendoza in my office today as a follow-up.    As you know, patient is 70 years old white female whose past medical history is significant for hyperlipidemia, who was referred to me for for abnormal coronary CT calcification test.    In , patient underwent cardiac catheterization and no significant coronary artery disease was noted.  Patient reports that lately she is complaining of chest discomfort described as a pressure-like sensation when she walks uphill or upstairs.  On a flat ground she does not report any chest pain.  Patient does have shortness of breath.  No orthopnea and PND no syncope or presyncope.  No leg edema noted.    Patient does not have previous history of CAD, PCI or MI.  Patient does not smoke or abuse alcohol.  Patient has family history for coronary artery disease with father had multiple stents.    In 2024, patient underwent CT calcium score send had total score of greater than 300.  Patient was recommended to have CTA but after discussion with her family she wanted to have cardiac catheterization.    In 2025, patient underwent cardiac catheterization which showed 10 to 20% plaque in proximal segment of LAD.,  LCx has 10% plaque in the midsegment and RCA had 10 to 20% plaque in proximal and mid segment.  No high-grade stenosis noted.    Patient came today and doing well.  Right groin has healed well.  Ultrasound of the groin did not show any pseudoaneurysm.  Patient denies any chest pain.  No orthopnea PND no syncope or presyncope.  No leg edema noted    At this stage, risk factor modification recommended.  Patient is  advised to lose weight.  Increase aerobic activity.  Patient is advised to stop taking aspirin.        At this stage I would recommend that patient should proceed with cardiac catheterization.  However after discussion with the patient we agreed to proceed with CTA coronary artery.  I would start sublingual nitroglycerin.  Continue aspirin.  I would increase Lipitor to 80 mg daily.      Past Medical History:   Diagnosis Date    Anxiety     Coronary artery disease     Gastroesophageal reflux disease without esophagitis     Menopausal syndrome     Mixed hyperlipidemia     Osteopenia of multiple sites     Paroxysmal supraventricular tachycardia     Primary osteoarthritis of both knees     Seasonal allergic rhinitis due to pollen          Past Surgical History:   Procedure Laterality Date    CARDIAC CATHETERIZATION  02/2007    Dr. Paris    CARDIAC CATHETERIZATION N/A 4/22/2025    Procedure: Left Heart Cath;  Surgeon: Boris Sahu MD;  Location: [facility] CATH INVASIVE LOCATION;  Service: Cardiovascular;  Laterality: N/A;    CATARACT EXTRACTION, BILATERAL      Abel Van    DILATION AND CURETTAGE, DIAGNOSTIC / THERAPEUTIC  1974    KNEE ARTHROSCOPY Left 05/22/2019    [facility] with partial medial and lateral meniscectomy    TONSILLECTOMY AND ADENOIDECTOMY  1959           Current Outpatient Medications:     ALPRAZolam (XANAX) 0.5 MG tablet, Take 1 tablet by mouth 2 (Two) Times a Day As Needed for Anxiety. Use sparingly., Disp: 30 tablet, Rfl: 0    amLODIPine (NORVASC) 5 MG tablet, Take 1 tablet by mouth Daily., Disp: 90 tablet, Rfl: 3    aspirin 81 MG EC tablet, Take 1 tablet by mouth Daily., Disp: , Rfl:     atorvastatin (LIPITOR) 80 MG tablet, Take 1 tablet by mouth Daily., Disp: 90 tablet, Rfl: 1    Melatonin 10 MG capsule, Take 1 capsule by mouth As Needed., Disp: , Rfl:     nitroglycerin (NITROSTAT) 0.3 MG SL tablet, 1 under the tongue as needed for angina, may repeat q5mins for up three doses, Disp: 100  "tablet, Rfl: 11    omeprazole (priLOSEC) 20 MG capsule, Take 1 capsule by mouth Daily., Disp: , Rfl:       Social History     Socioeconomic History    Marital status:    Tobacco Use    Smoking status: Never     Passive exposure: Never    Smokeless tobacco: Never   Vaping Use    Vaping status: Never Used   Substance and Sexual Activity    Alcohol use: Yes     Alcohol/week: 4.0 standard drinks of alcohol     Types: 4 Drinks containing 0.5 oz of alcohol per week    Drug use: Yes     Types: Marijuana    Sexual activity: Defer         Review of Systems   Constitutional: Negative for chills and fever.   HENT:  Negative for ear discharge and nosebleeds.    Eyes:  Negative for discharge and redness.   Cardiovascular:  Negative for chest pain, orthopnea, palpitations, paroxysmal nocturnal dyspnea and syncope.   Respiratory:  Negative for cough, shortness of breath and wheezing.    Endocrine: Negative for heat intolerance.   Skin:  Negative for rash.   Musculoskeletal:  Negative for arthritis and myalgias.   Gastrointestinal:  Negative for abdominal pain, melena, nausea and vomiting.   Genitourinary:  Negative for dysuria and hematuria.   Neurological:  Negative for dizziness, light-headedness, numbness and tremors.   Psychiatric/Behavioral:  Negative for depression. The patient is not nervous/anxious.        Procedures    Procedures    No orders to display           Objective:    /84 (BP Location: Right arm, Patient Position: Sitting, Cuff Size: Large Adult)   Pulse 83   Resp 18   Ht 157 cm (61.81\")   Wt 88.5 kg (195 lb)   LMP 01/01/2003 (Approximate)   SpO2 97%   BMI 35.88 kg/m²         Constitutional:       Appearance: Well-developed.   Eyes:      General: No scleral icterus.        Right eye: No discharge.   HENT:      Head: Normocephalic and atraumatic.   Neck:      Thyroid: No thyromegaly.      Lymphadenopathy: No cervical adenopathy.   Pulmonary:      Effort: Pulmonary effort is normal. No " respiratory distress.      Breath sounds: Normal breath sounds. No wheezing. No rales.   Cardiovascular:      Normal rate. Regular rhythm.      No gallop.    Edema:     Peripheral edema absent.   Abdominal:      Tenderness: There is no abdominal tenderness.   Skin:     Findings: No erythema or rash.   Neurological:      Mental Status: Alert and oriented to person, place, and time.             Assessment:       Diagnosis Plan   1. Coronary artery disease involving native coronary artery of native heart without angina pectoris        2. Paroxysmal supraventricular tachycardia        3. Precordial pain                 Plan:       MDM:    1.  CAD:    Patient has minimal coronary artery disease risk factor modification recommended    2.  Chest pain:    Patient symptoms are contained    3.  Mixed hyperlipidemia:    Patient is on Lipitor repeat lipid panel testing in 3 months    4.  Essential hypertension:    Blood pressure is slightly high but all blood pressure reading at home are within desirable range I would recommend observation no change in antihypertensive regimen

## 2025-05-30 ENCOUNTER — OFFICE VISIT (OUTPATIENT)
Dept: CARDIOLOGY | Facility: CLINIC | Age: 70
End: 2025-05-30
Payer: MEDICARE

## 2025-05-30 VITALS
RESPIRATION RATE: 18 BRPM | HEART RATE: 83 BPM | WEIGHT: 195 LBS | OXYGEN SATURATION: 97 % | BODY MASS INDEX: 35.88 KG/M2 | DIASTOLIC BLOOD PRESSURE: 84 MMHG | SYSTOLIC BLOOD PRESSURE: 151 MMHG | HEIGHT: 62 IN

## 2025-05-30 DIAGNOSIS — I47.10 PAROXYSMAL SUPRAVENTRICULAR TACHYCARDIA: ICD-10-CM

## 2025-05-30 DIAGNOSIS — I25.10 CORONARY ARTERY DISEASE INVOLVING NATIVE CORONARY ARTERY OF NATIVE HEART WITHOUT ANGINA PECTORIS: Primary | ICD-10-CM

## 2025-05-30 DIAGNOSIS — R07.2 PRECORDIAL PAIN: ICD-10-CM

## 2025-05-30 PROCEDURE — 99214 OFFICE O/P EST MOD 30 MIN: CPT | Performed by: INTERNAL MEDICINE

## 2025-05-30 PROCEDURE — 3077F SYST BP >= 140 MM HG: CPT | Performed by: INTERNAL MEDICINE

## 2025-05-30 PROCEDURE — 1159F MED LIST DOCD IN RCRD: CPT | Performed by: INTERNAL MEDICINE

## 2025-05-30 PROCEDURE — 3079F DIAST BP 80-89 MM HG: CPT | Performed by: INTERNAL MEDICINE

## 2025-05-30 PROCEDURE — 1160F RVW MEDS BY RX/DR IN RCRD: CPT | Performed by: INTERNAL MEDICINE

## 2025-07-17 ENCOUNTER — OFFICE VISIT (OUTPATIENT)
Age: 70
End: 2025-07-17
Payer: MEDICARE

## 2025-07-17 VITALS — BODY MASS INDEX: 36.82 KG/M2 | HEIGHT: 61 IN | WEIGHT: 195 LBS | RESPIRATION RATE: 20 BRPM | OXYGEN SATURATION: 96 %

## 2025-07-17 DIAGNOSIS — M17.0 PRIMARY OSTEOARTHRITIS OF BOTH KNEES: Primary | ICD-10-CM

## 2025-07-17 RX ORDER — TRIAMCINOLONE ACETONIDE 40 MG/ML
80 INJECTION, SUSPENSION INTRA-ARTICULAR; INTRAMUSCULAR
Status: COMPLETED | OUTPATIENT
Start: 2025-07-17 | End: 2025-07-17

## 2025-07-17 RX ORDER — LIDOCAINE HYDROCHLORIDE 10 MG/ML
2 INJECTION, SOLUTION EPIDURAL; INFILTRATION; INTRACAUDAL; PERINEURAL
Status: COMPLETED | OUTPATIENT
Start: 2025-07-17 | End: 2025-07-17

## 2025-07-17 RX ORDER — OXYCODONE HYDROCHLORIDE 5 MG/1
TABLET ORAL
COMMUNITY
Start: 2025-06-06

## 2025-07-17 RX ADMIN — LIDOCAINE HYDROCHLORIDE 2 ML: 10 INJECTION, SOLUTION EPIDURAL; INFILTRATION; INTRACAUDAL; PERINEURAL at 16:23

## 2025-07-17 RX ADMIN — TRIAMCINOLONE ACETONIDE 80 MG: 40 INJECTION, SUSPENSION INTRA-ARTICULAR; INTRAMUSCULAR at 16:23

## 2025-07-17 NOTE — PROGRESS NOTES
INJECTION VISIT    Patient: Camille Mendoza    YOB: 1955    MRN: [id number]    Chief Complaint   Patient presents with    Left Knee - Follow-up    Right Knee - Follow-up       History of Present Illness:   Camille Mendoza is a 70 y.o. year old who presents today for injection of bilateral knees with steroids.         Allergies:   Allergies   Allergen Reactions    Coffee Nausea Only and Palpitations    Meat Extract Hives     TICK Borne Allergy, hives       Medications:   Home Medications:  Current Outpatient Medications on File Prior to Visit   Medication Sig    ALPRAZolam (XANAX) 0.5 MG tablet Take 1 tablet by mouth 2 (Two) Times a Day As Needed for Anxiety. Use sparingly.    amLODIPine (NORVASC) 5 MG tablet Take 1 tablet by mouth Daily.    aspirin 81 MG EC tablet Take 1 tablet by mouth Daily.    atorvastatin (LIPITOR) 80 MG tablet Take 1 tablet by mouth Daily.    Melatonin 10 MG capsule Take 1 capsule by mouth As Needed.    nitroglycerin (NITROSTAT) 0.3 MG SL tablet 1 under the tongue as needed for angina, may repeat q5mins for up three doses    omeprazole (priLOSEC) 20 MG capsule Take 1 capsule by mouth Daily.    oxyCODONE (ROXICODONE) 5 MG immediate release tablet      No current facility-administered medications on file prior to visit.         I have reviewed the patient's medical history in detail and updated the computerized patient record.  Review and summarization of old records include:    Past Medical History:   Diagnosis Date    Anxiety     Coronary artery disease     Gastroesophageal reflux disease without esophagitis     Menopausal syndrome     Mixed hyperlipidemia     Osteopenia of multiple sites     Paroxysmal supraventricular tachycardia     Primary osteoarthritis of both knees     Seasonal allergic rhinitis due to pollen      Past Surgical History:   Procedure Laterality Date    CARDIAC CATHETERIZATION  02/2007    Dr. Paris    CARDIAC CATHETERIZATION N/A 4/22/2025    Procedure:  Left Heart Cath;  Surgeon: Boris Sahu MD;  Location: [facility] CATH INVASIVE LOCATION;  Service: Cardiovascular;  Laterality: N/A;    CATARACT EXTRACTION, BILATERAL      Abel Troy    DILATION AND CURETTAGE, DIAGNOSTIC / THERAPEUTIC  1974    KNEE ARTHROSCOPY Left 05/22/2019    [facility] with partial medial and lateral meniscectomy    TONSILLECTOMY AND ADENOIDECTOMY  1959     Social History     Occupational History    Not on file   Tobacco Use    Smoking status: Never     Passive exposure: Never    Smokeless tobacco: Never   Vaping Use    Vaping status: Never Used   Substance and Sexual Activity    Alcohol use: Yes     Alcohol/week: 4.0 standard drinks of alcohol     Types: 4 Drinks containing 0.5 oz of alcohol per week    Drug use: Yes     Types: Marijuana    Sexual activity: Defer      Social History     Social History Narrative    Not on file     Family History   Problem Relation Age of Onset    Arthritis Mother     Skin cancer Mother     Diabetes Father     Heart disease Father     Hypertension Father     Breast cancer Neg Hx     Ovarian cancer Neg Hx                ROS  Negative unless listed in the HPI        Physical Exam  70 y.o. female  Body mass index is 36.84 kg/m²., 88.5 kg (195 lb)  Vitals:    07/17/25 1535   Resp: 20   SpO2: 96%     General: Alert, cooperative, appears well and in no observable distress.   HEENT: Normocephalic, atraumatic on external visual inspection. No icterus.   CV: No significant peripheral edema.   Respiratory: Normal respiratory effort.   Skin: Warm & well perfused; appropriate skin turgor.  Psych: Appropriate mood & affect.  Neuro: Gross sensation and motor intact in affected extremity/extremities.  Vascular: Peripheral pulses palpable in affected extremity/extremities.         Procedure:  Large Joint Arthrocentesis: R knee  Date/Time: 7/17/2025 4:23 PM  Consent given by: patient  Site marked: site marked  Timeout: Immediately prior to procedure a time out was called to  verify the correct patient, procedure, equipment, support staff and site/side marked as required   Supporting Documentation  Indications: pain and diagnostic evaluation   Procedure Details  Location: knee - R knee  Preparation: Patient was prepped and draped in the usual sterile fashion  Needle size: 25 G  Approach: anterolateral  Medications administered: 2 mL lidocaine PF 1% 1 %; 80 mg triamcinolone acetonide 40 MG/ML  Patient tolerance: patient tolerated the procedure well with no immediate complications      Large Joint Arthrocentesis: L knee  Date/Time: 7/17/2025 4:23 PM  Consent given by: patient  Site marked: site marked  Timeout: Immediately prior to procedure a time out was called to verify the correct patient, procedure, equipment, support staff and site/side marked as required   Supporting Documentation  Indications: pain and diagnostic evaluation   Procedure Details  Location: knee - L knee  Preparation: Patient was prepped and draped in the usual sterile fashion  Needle size: 25 G  Approach: anterolateral  Medications administered: 2 mL lidocaine PF 1% 1 %; 80 mg triamcinolone acetonide 40 MG/ML  Patient tolerance: patient tolerated the procedure well with no immediate complications            Assessment:   Diagnoses and all orders for this visit:    1. Primary osteoarthritis of both knees (Primary)    Other orders  -     Large Joint Arthrocentesis  -     Large Joint Arthrocentesis      Body mass index is 36.84 kg/m².  BMI consistent with Obese Class II: 35-39.9kg/m2        Plan:   -     Risks and benefits of injection therapy discussed with patient.    Injected patient's bilateral knee joint(s)with Kenalog from an anterolateral approach   Compression/brace   Rest, ice, compression, and elevation (RICE) therapy  OTC Tylenol for pain PRN  Follow up in 3 months or PRN for repeat injections   Please call with questions or concerns in the interim        Date of encounter: 07/17/2025   Cathryn Engle  APRN

## 2025-08-04 ENCOUNTER — OFFICE VISIT (OUTPATIENT)
Dept: FAMILY MEDICINE CLINIC | Facility: CLINIC | Age: 70
End: 2025-08-04
Payer: MEDICARE

## 2025-08-04 VITALS
HEART RATE: 84 BPM | OXYGEN SATURATION: 98 % | RESPIRATION RATE: 18 BRPM | HEIGHT: 62 IN | WEIGHT: 196.8 LBS | DIASTOLIC BLOOD PRESSURE: 72 MMHG | TEMPERATURE: 98 F | BODY MASS INDEX: 36.22 KG/M2 | SYSTOLIC BLOOD PRESSURE: 118 MMHG

## 2025-08-04 DIAGNOSIS — B35.1 NAIL FUNGUS: ICD-10-CM

## 2025-08-04 DIAGNOSIS — E78.2 MIXED HYPERLIPIDEMIA: ICD-10-CM

## 2025-08-04 DIAGNOSIS — I10 PRIMARY HYPERTENSION: Primary | ICD-10-CM

## 2025-08-04 DIAGNOSIS — R73.03 PREDIABETES: ICD-10-CM

## 2025-08-04 DIAGNOSIS — Z13.29 SCREENING FOR THYROID DISORDER: ICD-10-CM

## 2025-08-04 DIAGNOSIS — F41.9 ANXIETY: ICD-10-CM

## 2025-08-04 PROCEDURE — 1160F RVW MEDS BY RX/DR IN RCRD: CPT | Performed by: FAMILY MEDICINE

## 2025-08-04 PROCEDURE — 3078F DIAST BP <80 MM HG: CPT | Performed by: FAMILY MEDICINE

## 2025-08-04 PROCEDURE — 1126F AMNT PAIN NOTED NONE PRSNT: CPT | Performed by: FAMILY MEDICINE

## 2025-08-04 PROCEDURE — 3074F SYST BP LT 130 MM HG: CPT | Performed by: FAMILY MEDICINE

## 2025-08-04 PROCEDURE — 99214 OFFICE O/P EST MOD 30 MIN: CPT | Performed by: FAMILY MEDICINE

## 2025-08-04 PROCEDURE — 1159F MED LIST DOCD IN RCRD: CPT | Performed by: FAMILY MEDICINE

## 2025-08-04 PROCEDURE — G2211 COMPLEX E/M VISIT ADD ON: HCPCS | Performed by: FAMILY MEDICINE

## 2025-08-04 RX ORDER — CICLOPIROX 80 MG/ML
SOLUTION TOPICAL NIGHTLY
Qty: 6.6 ML | Refills: 5 | Status: SHIPPED | OUTPATIENT
Start: 2025-08-04

## (undated) DEVICE — PINNACLE INTRODUCER SHEATH: Brand: PINNACLE

## (undated) DEVICE — CATH DIAG IMPULSE FL4 6F 100CM

## (undated) DEVICE — NDL PERC 1PRT THNWALL W/BASEPLT 18G 7CM

## (undated) DEVICE — PK TRY HEART CATH 50

## (undated) DEVICE — CATH DIAG IMPULSE PIG .056 6F 110CM

## (undated) DEVICE — CATH DIAG IMPULSE FR4 6F 100CM

## (undated) DEVICE — DGW .035 FC J3MM 150CM TEF HEP: Brand: EMERALD